# Patient Record
Sex: FEMALE | Race: BLACK OR AFRICAN AMERICAN | NOT HISPANIC OR LATINO | URBAN - METROPOLITAN AREA
[De-identification: names, ages, dates, MRNs, and addresses within clinical notes are randomized per-mention and may not be internally consistent; named-entity substitution may affect disease eponyms.]

---

## 2023-07-22 ENCOUNTER — HOSPITAL ENCOUNTER (EMERGENCY)
Facility: HOSPITAL | Age: 78
Discharge: HOME | End: 2023-07-23
Attending: EMERGENCY MEDICINE
Payer: MEDICARE
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
## 2023-07-22 DIAGNOSIS — S01.81XA FACIAL LACERATION, INITIAL ENCOUNTER: ICD-10-CM

## 2023-07-22 DIAGNOSIS — S09.93XA FACIAL TRAUMA, INITIAL ENCOUNTER: ICD-10-CM

## 2023-07-22 PROCEDURE — 99285 EMERGENCY DEPT VISIT HI MDM: CPT | Mod: 25,ER

## 2023-07-23 VITALS
TEMPERATURE: 98 F | HEART RATE: 88 BPM | RESPIRATION RATE: 20 BRPM | DIASTOLIC BLOOD PRESSURE: 78 MMHG | SYSTOLIC BLOOD PRESSURE: 123 MMHG | OXYGEN SATURATION: 99 %

## 2023-07-23 PROCEDURE — 90715 TDAP VACCINE 7 YRS/> IM: CPT | Mod: ER | Performed by: EMERGENCY MEDICINE

## 2023-07-23 PROCEDURE — 90471 IMMUNIZATION ADMIN: CPT | Mod: ER | Performed by: EMERGENCY MEDICINE

## 2023-07-23 PROCEDURE — 12011 RPR F/E/E/N/L/M 2.5 CM/<: CPT | Mod: ER

## 2023-07-23 PROCEDURE — 63600175 PHARM REV CODE 636 W HCPCS: Mod: ER | Performed by: EMERGENCY MEDICINE

## 2023-07-23 RX ORDER — NAPROXEN 500 MG/1
500 TABLET ORAL 2 TIMES DAILY WITH MEALS
Qty: 20 TABLET | Refills: 0 | Status: SHIPPED | OUTPATIENT
Start: 2023-07-23 | End: 2023-09-14

## 2023-07-23 RX ADMIN — TETANUS TOXOID, REDUCED DIPHTHERIA TOXOID AND ACELLULAR PERTUSSIS VACCINE, ADSORBED 0.5 ML: 5; 2.5; 8; 8; 2.5 SUSPENSION INTRAMUSCULAR at 12:07

## 2023-07-23 NOTE — ED PROVIDER NOTES
Encounter Date: 7/22/2023    SCRIBE #1 NOTE: I, Javier Ramos, am scribing for, and in the presence of,  Jeremy Galarza MD. I have scribed the following portions of the note - Other sections scribed: HPI, ROS, PE.     History     Chief Complaint   Patient presents with    Fall     A 78 y/o female presents to the ED c/o head laceration post fall. Pt hit head on bed post. Pt denies LOC, Dizziness, and Weakness. The pt was attempting to reach for phone  and slipped. Laceration to center of forehead. Bleeding controlled.      77 year old female with past medical history of Hypertension and Hyperlipidemia presenting to the Emergency room for further evaluation of laceration to the center of forehead after sustaining a fall, hitting her head on the bed post. Denies LOC. Patient reports she slipped while reaching for her phone. She is on low dose aspirin. No other exacerbating or alleviating factors. NKDA. Unaware of last tetanus. Denies dizziness, weakness, or other symptoms. No further complaints at present time.      The history is provided by the patient. No  was used.   Review of patient's allergies indicates:  No Known Allergies  No past medical history on file.  No past surgical history on file.  No family history on file.     Review of Systems   Constitutional: Negative.  Negative for fever.   HENT: Negative.  Negative for sore throat.    Eyes: Negative.    Respiratory: Negative.  Negative for shortness of breath.    Cardiovascular: Negative.  Negative for chest pain.   Gastrointestinal: Negative.  Negative for nausea and vomiting.   Endocrine: Negative.    Genitourinary: Negative.  Negative for dysuria.   Musculoskeletal: Negative.  Negative for myalgias.   Skin:  Positive for wound. Negative for rash.   Allergic/Immunologic: Negative.    Neurological: Negative.  Negative for dizziness, syncope, weakness and headaches.   Hematological: Negative.  Negative for adenopathy.    Psychiatric/Behavioral: Negative.  Negative for behavioral problems.    All other systems reviewed and are negative.    Physical Exam     Initial Vitals [07/22/23 2211]   BP Pulse Resp Temp SpO2   119/80 102 16 98.2 °F (36.8 °C) 98 %      MAP       --         Physical Exam    Nursing note and vitals reviewed.  Constitutional: Vital signs are normal. She appears well-developed and well-nourished. No distress.   Neck: Phonation normal. Neck supple.   Normal range of motion.  Cardiovascular:  Normal rate, regular rhythm and normal heart sounds.     Exam reveals no gallop and no friction rub.       No murmur heard.  Pulmonary/Chest: Effort normal and breath sounds normal. She has no wheezes.   Abdominal: Abdomen is soft and flat. Bowel sounds are normal.   Musculoskeletal:         General: Normal range of motion.      Cervical back: Normal range of motion and neck supple.     Neurological: She is alert and oriented to person, place, and time.   Skin: Skin is warm and dry.   1.5 cm superficial laceration with surrounding swelling to the midline forehead. Full ROM.        ED Course   Lac Repair    Date/Time: 7/23/2023 6:15 AM  Performed by: Jeremy Galarza MD  Authorized by: Jeremy Galarza MD     Consent:     Consent obtained:  Verbal    Consent given by:  Patient    Risks discussed:  Pain, poor cosmetic result and need for additional repair    Alternatives discussed:  No treatment and delayed treatment  Universal protocol:     Procedure explained and questions answered to patient or proxy's satisfaction: yes      Test results available: yes      Imaging studies available: yes      Required blood products, implants, devices, and special equipment available: yes      Site/side marked: yes      Immediately prior to procedure, a time out was called: yes      Patient identity confirmed:  Verbally with patient  Anesthesia:     Anesthesia method:  None  Laceration details:     Location:  Face    Face location:   Forehead    Length (cm):  1.5    Depth (mm):  3  Pre-procedure details:     Preparation:  Patient was prepped and draped in usual sterile fashion and imaging obtained to evaluate for foreign bodies  Exploration:     Hemostasis achieved with:  Direct pressure    Imaging outcome: foreign body not noted      Wound exploration: wound explored through full range of motion and entire depth of wound visualized    Treatment:     Area cleansed with:  Povidone-iodine    Amount of cleaning:  Standard    Irrigation solution:  Sterile saline    Irrigation volume:  50    Irrigation method:  Syringe    Debridement:  None    Undermining:  None    Scar revision: no    Skin repair:     Repair method:  Tissue adhesive  Approximation:     Approximation:  Close  Repair type:     Repair type:  Simple  Post-procedure details:     Dressing:  Open (no dressing)    Procedure completion:  Tolerated well, no immediate complications  Labs Reviewed - No data to display       Imaging Results              CT Head Without Contrast (Final result)  Result time 07/23/23 00:02:26      Final result by Gonzalez Tran MD (07/23/23 00:02:26)                   Impression:      No acute abnormality.      Electronically signed by: Gonzalez Tran  Date:    07/23/2023  Time:    00:02               Narrative:    EXAMINATION:  CT HEAD WITHOUT CONTRAST    CLINICAL HISTORY:  Facial trauma, blunt;    TECHNIQUE:  Low dose axial CT images obtained throughout the head without intravenous contrast. Sagittal and coronal reconstructions were performed.    COMPARISON:  None.    FINDINGS:  Intracranial compartment:    Ventricles and sulci are prominent in size for age without evidence of hydrocephalus.  There is cavum septum pellucidum.  No extra-axial blood or fluid collections.    The brain parenchyma appears normal. No parenchymal mass, hemorrhage, edema or major vascular distribution infarct.    Skull/extracranial contents (limited evaluation): No fracture.  Mastoid air cells and paranasal sinuses are essentially clear.                                       Medications   Tdap (BOOSTRIX) vaccine injection 0.5 mL (0.5 mLs Intramuscular Given 7/23/23 0007)     Medical Decision Making:   History:   Old Medical Records: I decided to obtain old medical records.  Clinical Tests:   Radiological Study: Ordered and Reviewed  ED Management:  This patient is status post slip and fall and no loss of consciousness.  She denies any neck pain.  Patient does take aspirin daily so I felt appropriate to obtain a CT scan for her blunt facial trauma.  Her CT evaluation was unremarkable.  The patient's wound was appropriately manage with Dermabond and subsequently discharged home with discharge instructions.  Patient has no limitations to care at home.      This document was produced by a scribe under my direction and in my presence. I agree with the content of the note and have made any necessary edits.     Jeremy Galarza MD    07/23/2023 6:16 AM        Scribe Attestation:   Scribe #1: I performed the above scribed service and the documentation accurately describes the services I performed. I attest to the accuracy of the note.                   Clinical Impression:   Final diagnoses:  [W19.XXXA] Fall, initial encounter (Primary)  [S09.93XA] Facial trauma, initial encounter  [S01.81XA] Facial laceration, initial encounter        ED Disposition Condition    Discharge Stable          ED Prescriptions       Medication Sig Dispense Start Date End Date Auth. Provider    naproxen (NAPROSYN) 500 MG tablet Take 1 tablet (500 mg total) by mouth 2 (two) times daily with meals. 20 tablet 7/23/2023 -- Jeremy Galarza MD          Follow-up Information       Follow up With Specialties Details Why Contact Info      Schedule an appointment as soon as possible for a visit in 1 week               Jeremy Galarza MD  07/23/23 0669

## 2023-09-14 ENCOUNTER — OFFICE VISIT (OUTPATIENT)
Dept: INTERNAL MEDICINE | Facility: CLINIC | Age: 78
End: 2023-09-14
Payer: MEDICARE

## 2023-09-14 ENCOUNTER — LAB VISIT (OUTPATIENT)
Dept: LAB | Facility: HOSPITAL | Age: 78
End: 2023-09-14
Payer: MEDICARE

## 2023-09-14 VITALS
WEIGHT: 173.31 LBS | HEART RATE: 97 BPM | OXYGEN SATURATION: 97 % | SYSTOLIC BLOOD PRESSURE: 114 MMHG | DIASTOLIC BLOOD PRESSURE: 60 MMHG | BODY MASS INDEX: 29.59 KG/M2 | HEIGHT: 64 IN

## 2023-09-14 DIAGNOSIS — Z00.00 ENCOUNTER FOR MEDICAL EXAMINATION TO ESTABLISH CARE: ICD-10-CM

## 2023-09-14 DIAGNOSIS — N39.41 URGE INCONTINENCE: ICD-10-CM

## 2023-09-14 DIAGNOSIS — E78.5 HYPERLIPIDEMIA, UNSPECIFIED HYPERLIPIDEMIA TYPE: ICD-10-CM

## 2023-09-14 DIAGNOSIS — R41.3 MEMORY IMPAIRMENT: ICD-10-CM

## 2023-09-14 DIAGNOSIS — Z00.00 ENCOUNTER FOR MEDICAL EXAMINATION TO ESTABLISH CARE: Primary | ICD-10-CM

## 2023-09-14 LAB
ALBUMIN SERPL BCP-MCNC: 3.7 G/DL (ref 3.5–5.2)
ALP SERPL-CCNC: 113 U/L (ref 55–135)
ALT SERPL W/O P-5'-P-CCNC: 18 U/L (ref 10–44)
AMORPH CRY UR QL COMP ASSIST: NORMAL
ANION GAP SERPL CALC-SCNC: 12 MMOL/L (ref 8–16)
AST SERPL-CCNC: 22 U/L (ref 10–40)
BACTERIA #/AREA URNS AUTO: NORMAL /HPF
BASOPHILS # BLD AUTO: 0.11 K/UL (ref 0–0.2)
BASOPHILS NFR BLD: 1.1 % (ref 0–1.9)
BILIRUB SERPL-MCNC: 0.3 MG/DL (ref 0.1–1)
BILIRUB UR QL STRIP: NEGATIVE
BUN SERPL-MCNC: 12 MG/DL (ref 8–23)
CALCIUM SERPL-MCNC: 9.9 MG/DL (ref 8.7–10.5)
CHLORIDE SERPL-SCNC: 110 MMOL/L (ref 95–110)
CLARITY UR REFRACT.AUTO: ABNORMAL
CO2 SERPL-SCNC: 24 MMOL/L (ref 23–29)
COLOR UR AUTO: ABNORMAL
CREAT SERPL-MCNC: 0.9 MG/DL (ref 0.5–1.4)
DIFFERENTIAL METHOD: ABNORMAL
EOSINOPHIL # BLD AUTO: 0.3 K/UL (ref 0–0.5)
EOSINOPHIL NFR BLD: 3.1 % (ref 0–8)
ERYTHROCYTE [DISTWIDTH] IN BLOOD BY AUTOMATED COUNT: 13 % (ref 11.5–14.5)
EST. GFR  (NO RACE VARIABLE): >60 ML/MIN/1.73 M^2
GLUCOSE SERPL-MCNC: 73 MG/DL (ref 70–110)
GLUCOSE UR QL STRIP: NEGATIVE
HCT VFR BLD AUTO: 41.7 % (ref 37–48.5)
HGB BLD-MCNC: 13.1 G/DL (ref 12–16)
HGB UR QL STRIP: NEGATIVE
IMM GRANULOCYTES # BLD AUTO: 0.02 K/UL (ref 0–0.04)
IMM GRANULOCYTES NFR BLD AUTO: 0.2 % (ref 0–0.5)
KETONES UR QL STRIP: NEGATIVE
LEUKOCYTE ESTERASE UR QL STRIP: ABNORMAL
LYMPHOCYTES # BLD AUTO: 3.2 K/UL (ref 1–4.8)
LYMPHOCYTES NFR BLD: 30.4 % (ref 18–48)
MCH RBC QN AUTO: 31.8 PG (ref 27–31)
MCHC RBC AUTO-ENTMCNC: 31.4 G/DL (ref 32–36)
MCV RBC AUTO: 101 FL (ref 82–98)
MICROSCOPIC COMMENT: NORMAL
MONOCYTES # BLD AUTO: 0.9 K/UL (ref 0.3–1)
MONOCYTES NFR BLD: 8.7 % (ref 4–15)
NEUTROPHILS # BLD AUTO: 5.9 K/UL (ref 1.8–7.7)
NEUTROPHILS NFR BLD: 56.5 % (ref 38–73)
NITRITE UR QL STRIP: NEGATIVE
NRBC BLD-RTO: 0 /100 WBC
PH UR STRIP: 6 [PH] (ref 5–8)
PLATELET # BLD AUTO: 296 K/UL (ref 150–450)
PMV BLD AUTO: 10.8 FL (ref 9.2–12.9)
POTASSIUM SERPL-SCNC: 4.2 MMOL/L (ref 3.5–5.1)
PROT SERPL-MCNC: 7.1 G/DL (ref 6–8.4)
PROT UR QL STRIP: ABNORMAL
RBC # BLD AUTO: 4.12 M/UL (ref 4–5.4)
SODIUM SERPL-SCNC: 146 MMOL/L (ref 136–145)
SP GR UR STRIP: >1.03 (ref 1–1.03)
SQUAMOUS #/AREA URNS AUTO: 2 /HPF
TSH SERPL DL<=0.005 MIU/L-ACNC: 4.37 UIU/ML (ref 0.4–4)
URN SPEC COLLECT METH UR: ABNORMAL
VIT B12 SERPL-MCNC: 477 PG/ML (ref 210–950)
WBC # BLD AUTO: 10.36 K/UL (ref 3.9–12.7)
WBC #/AREA URNS AUTO: 0 /HPF (ref 0–5)

## 2023-09-14 PROCEDURE — 36415 COLL VENOUS BLD VENIPUNCTURE: CPT | Performed by: PHYSICIAN ASSISTANT

## 2023-09-14 PROCEDURE — 81001 URINALYSIS AUTO W/SCOPE: CPT | Performed by: PHYSICIAN ASSISTANT

## 2023-09-14 PROCEDURE — 99214 OFFICE O/P EST MOD 30 MIN: CPT | Mod: PBBFAC | Performed by: PHYSICIAN ASSISTANT

## 2023-09-14 PROCEDURE — 99203 PR OFFICE/OUTPT VISIT, NEW, LEVL III, 30-44 MIN: ICD-10-PCS | Mod: S$PBB,,, | Performed by: PHYSICIAN ASSISTANT

## 2023-09-14 PROCEDURE — 80053 COMPREHEN METABOLIC PANEL: CPT | Performed by: PHYSICIAN ASSISTANT

## 2023-09-14 PROCEDURE — 84443 ASSAY THYROID STIM HORMONE: CPT | Performed by: PHYSICIAN ASSISTANT

## 2023-09-14 PROCEDURE — 99999 PR PBB SHADOW E&M-EST. PATIENT-LVL IV: CPT | Mod: PBBFAC,,, | Performed by: PHYSICIAN ASSISTANT

## 2023-09-14 PROCEDURE — 99203 OFFICE O/P NEW LOW 30 MIN: CPT | Mod: S$PBB,,, | Performed by: PHYSICIAN ASSISTANT

## 2023-09-14 PROCEDURE — 84439 ASSAY OF FREE THYROXINE: CPT | Performed by: PHYSICIAN ASSISTANT

## 2023-09-14 PROCEDURE — 99999 PR PBB SHADOW E&M-EST. PATIENT-LVL IV: ICD-10-PCS | Mod: PBBFAC,,, | Performed by: PHYSICIAN ASSISTANT

## 2023-09-14 PROCEDURE — 82607 VITAMIN B-12: CPT | Performed by: PHYSICIAN ASSISTANT

## 2023-09-14 PROCEDURE — 85025 COMPLETE CBC W/AUTO DIFF WBC: CPT | Performed by: PHYSICIAN ASSISTANT

## 2023-09-14 RX ORDER — DONEPEZIL HYDROCHLORIDE 10 MG/1
10 TABLET, FILM COATED ORAL
COMMUNITY
Start: 2023-08-07 | End: 2024-02-20 | Stop reason: SDUPTHER

## 2023-09-14 RX ORDER — ATORVASTATIN CALCIUM 10 MG/1
10 TABLET, FILM COATED ORAL
COMMUNITY
Start: 2023-08-07

## 2023-09-14 RX ORDER — MEMANTINE HYDROCHLORIDE 10 MG/1
10 TABLET ORAL 2 TIMES DAILY
COMMUNITY
Start: 2023-08-15 | End: 2024-02-20

## 2023-09-14 NOTE — PATIENT INSTRUCTIONS
I will have our staff schedule appt with one of the physicians to be your new lead PCP. I work closely with Dr. Siva Venegas.     I will message/call with results    Schedule appt with Neurology

## 2023-09-15 PROBLEM — M19.90 OSTEOARTHRITIS: Status: ACTIVE | Noted: 2023-09-15

## 2023-09-15 PROBLEM — R41.3 MEMORY IMPAIRMENT: Status: ACTIVE | Noted: 2022-10-03

## 2023-09-15 PROBLEM — E78.5 HYPERLIPIDEMIA: Status: ACTIVE | Noted: 2023-09-15

## 2023-09-15 PROBLEM — R01.1 HEART MURMUR: Status: ACTIVE | Noted: 2023-09-15

## 2023-09-15 LAB — T4 FREE SERPL-MCNC: 0.92 NG/DL (ref 0.71–1.51)

## 2023-09-15 NOTE — PROGRESS NOTES
Subjective:       Patient ID: Viji Santana is a 78 y.o. female.    Chief Complaint: Establish Care      Established pt of No, Primary Doctor (new to me)    HPI        Pt attended by her dtr  Here to establish care.   Moved back home to [place] after living in [facility] since [place].   She has a history of memory impairment, not formally diagnosed with dementia, current on namenda and aricept prescribed her PCP in [place] over the past year. She will be living in a senior assistant living facility her Dtr/pt desires formal neurology evaluation regarding her memory.     Dtr notes urge incontinence pt wear bladder pads    HLD: on statin therapy, tolerating well.       Past Medical History:   Diagnosis Date    Hyperlipidemia     Memory impairment        Social History     Tobacco Use    Smoking status: Former     Current packs/day: 0.00     Average packs/day: 0.3 packs/day for 15.0 years (3.8 ttl pk-yrs)     Types: Cigarettes     Quit date: 1983     Years since quittin.7    Smokeless tobacco: Never    Tobacco comments:     Quit    Substance Use Topics    Alcohol use: Yes     Alcohol/week: 2.0 standard drinks of alcohol     Types: 2 Glasses of wine per week    Drug use: Never       Review of patient's allergies indicates:  No Known Allergies      Current Outpatient Medications:     atorvastatin (LIPITOR) 10 MG tablet, Take 10 mg by mouth., Disp: , Rfl:     donepeziL (ARICEPT) 10 MG tablet, Take 10 mg by mouth., Disp: , Rfl:     memantine (NAMENDA) 10 MG Tab, Take 10 mg by mouth 2 (two) times daily., Disp: , Rfl:     Family History   Problem Relation Age of Onset    Hypertension Mother     Heart disease Mother     Hypertension Brother     Hypertension Brother     Alzheimer's disease Maternal Aunt        Past Surgical History:   Procedure Laterality Date    EYE SURGERY      Procedure to relieve eye pressure       Review of Systems   Constitutional:  Negative for chills, fever and  "unexpected weight change.   Respiratory:  Negative for cough and shortness of breath.    Cardiovascular:  Negative for chest pain and leg swelling.   Gastrointestinal:  Negative for abdominal pain, nausea and vomiting.   Genitourinary:  Positive for bladder incontinence and urgency.   Integumentary:  Negative for rash.   Neurological:  Positive for memory loss. Negative for weakness and headaches.       Objective: /60 (BP Location: Right arm, Patient Position: Sitting, BP Method: Medium (Manual))   Pulse 97   Ht 5' 4" (1.626 m)   Wt 78.6 kg (173 lb 4.5 oz)   SpO2 97%   BMI 29.74 kg/m²         Physical Exam  Vitals reviewed.   Constitutional:       General: She is not in acute distress.     Appearance: She is well-developed. She is not ill-appearing.   HENT:      Head: Normocephalic and atraumatic.      Right Ear: Tympanic membrane, ear canal and external ear normal.      Left Ear: Tympanic membrane, ear canal and external ear normal.   Cardiovascular:      Rate and Rhythm: Normal rate and regular rhythm.      Heart sounds: No murmur heard.  Pulmonary:      Effort: Pulmonary effort is normal.      Breath sounds: Normal breath sounds. No wheezing or rales.   Abdominal:      General: Bowel sounds are normal.      Palpations: Abdomen is soft.      Tenderness: There is no abdominal tenderness.   Musculoskeletal:      Right lower leg: No edema.      Left lower leg: No edema.      Comments: Ambulating unassisted   Lymphadenopathy:      Cervical: No cervical adenopathy.   Skin:     General: Skin is warm and dry.      Findings: No rash.   Neurological:      Mental Status: She is alert and oriented to person, place, and time.      Comments: Pleasant alert oriented x3   Psychiatric:         Mood and Affect: Mood normal.         Assessment:       1. Encounter for medical examination to establish care    2. Urge incontinence    3. Memory impairment    4. Hyperlipidemia, unspecified hyperlipidemia type        Plan:     "         Viji was seen today for establish care.    Diagnoses and all orders for this visit:    Encounter for medical examination to establish care  -     CBC Auto Differential; Future  -     Comprehensive Metabolic Panel; Future  -     TSH; Future    Urge incontinence  -     CBC Auto Differential; Future  -     Comprehensive Metabolic Panel; Future  -     TSH; Future  -     Urinalysis, Reflex to Urine Culture Urine, Clean Catch    Memory impairment  -     CBC Auto Differential; Future  -     Comprehensive Metabolic Panel; Future  -     TSH; Future  -     Vitamin B12; Future  -     Ambulatory referral/consult to Neurology; Future    Hyperlipidemia, unspecified hyperlipidemia type  -     CBC Auto Differential; Future  -     Comprehensive Metabolic Panel; Future  -     TSH; Future    Other orders  -     Urinalysis Microscopic        Discussed need to choose MD as PCP to fully establish care with our practice site (list provided)  Discussed MD-PA care team model  Will review outside records(101 pgs via media tab), additional DWIGHT completed  Referred to Neurology  Follow up results above    Pauly Waller PA-C

## 2023-10-10 ENCOUNTER — TELEPHONE (OUTPATIENT)
Dept: INTERNAL MEDICINE | Facility: CLINIC | Age: 78
End: 2023-10-10
Payer: MEDICARE

## 2023-10-10 NOTE — TELEPHONE ENCOUNTER
Pt daughter inquiring what next step is     Pt has unscheduled Neurology referral; earliest appt is Jan 30.

## 2023-10-10 NOTE — TELEPHONE ENCOUNTER
----- Message from Emma Brooks sent at 10/9/2023  3:34 PM CDT -----  Contact: [phone] Rajni  [employer]     Patient is calling for Medical Advice regarding:Daughter is calling to talk about last visit/ Daughter want to know about follow up and what is the next step    How long has patient had these symptoms:    Pharmacy name and phone#:    Would like response via [facility]:  call     Comments:

## 2023-10-11 NOTE — TELEPHONE ENCOUNTER
Pt needs Neurology appt (memory clinic) and then appt with MD to fully establish care.   Her lab was overall stable, no new concerns. Please let me know of any specific question  Please assist with scheduling, (offer Neuro clinic number if needed or checkout staff to help)

## 2023-10-11 NOTE — TELEPHONE ENCOUNTER
""Pt needs Neurology appt (memory clinic) and then appt with MD to fully establish care. "    Please assist   " Yes

## 2023-10-12 ENCOUNTER — TELEPHONE (OUTPATIENT)
Dept: NEUROLOGY | Facility: CLINIC | Age: 78
End: 2023-10-12
Payer: MEDICARE

## 2023-10-12 ENCOUNTER — TELEPHONE (OUTPATIENT)
Dept: INTERNAL MEDICINE | Facility: CLINIC | Age: 78
End: 2023-10-12
Payer: MEDICARE

## 2023-10-12 NOTE — TELEPHONE ENCOUNTER
----- Message from Arlen Hutson sent at 10/12/2023  8:35 AM CDT -----  Regarding: Neurology and Est Care Appointment  Pt has been added to waitlist for both Neurology and Est Care appointments. No appointments available at checkout.    Message has also been sent to Neurology department for scheduling

## 2023-10-12 NOTE — TELEPHONE ENCOUNTER
----- Message from Arlen Hutson sent at 10/12/2023  8:34 AM CDT -----  Regarding: Memory impairment [R41.3]  Please assist with scheduling appointment     Memory impairment [R41.3]     Pt returned the phone call about refill. States she is in a lot of pain. Pls call again at   CELL: [phone] when sent to pharmacy

## 2023-10-17 ENCOUNTER — TELEPHONE (OUTPATIENT)
Dept: NEUROLOGY | Facility: CLINIC | Age: 78
End: 2023-10-17
Payer: MEDICARE

## 2023-10-17 NOTE — TELEPHONE ENCOUNTER
----- Message from Wolfgang LOPEZ Route sent at 10/17/2023  1:00 PM CDT -----  Regarding: Missed call  Contact: pt.[phone]  Pt's daughter Rajni is calling in ref to a missed call from Padma in provider's office, the call is to schedule an appt with provider. Patient Requesting Call Back @ [phone]

## 2023-10-17 NOTE — TELEPHONE ENCOUNTER
----- Message from Milagro Platt sent at 10/17/2023  4:11 PM CDT -----  Contact: [phone]  KATHERINE JACKSON calling regarding Appointment Access  (message) Pt daughter asking for a call back to help get schedule to become a new pt she has a referral in the system asking for a call back from the nurse

## 2023-10-17 NOTE — TELEPHONE ENCOUNTER
Called pt's daughter back to sched NP appt w/ Dr Faulkner per his approval  She said she will call back

## 2023-10-19 ENCOUNTER — TELEPHONE (OUTPATIENT)
Dept: NEUROLOGY | Facility: CLINIC | Age: 78
End: 2023-10-19
Payer: MEDICARE

## 2023-10-19 NOTE — TELEPHONE ENCOUNTER
----- Message from Haleigh Cam sent at 10/19/2023  2:08 PM CDT -----  Regarding: appt; returning missed call  Contact: Rajni (daughter) @ [phone]  Caller, Rajni (daughter) is returning a missed call from someone in the office and is asking for a return call back soon. Thanks.    Reason for call: returning call to Natali Albert) in the office to schedule    Patient's DX: R41.3 (ICD-10-CM) - Memory impairment    Patient requesting call back or [facility] msg: call back. Caller states that she will be unavailable 4:00-4:30pm CST. She lives in [place]. Thanks.

## 2023-10-19 NOTE — TELEPHONE ENCOUNTER
Called pt's daughter back to sched NP appt  Spoke w/ her  Scheduled appt  She verbalized understanding and had no further concerns or questions.

## 2023-12-18 ENCOUNTER — TELEPHONE (OUTPATIENT)
Dept: NEUROLOGY | Facility: CLINIC | Age: 78
End: 2023-12-18
Payer: MEDICARE

## 2023-12-18 NOTE — TELEPHONE ENCOUNTER
----- Message from Roxy Carrillo sent at 12/18/2023  4:54 PM CST -----  Contact: Rajni  (pt's daughter) [phone]  Would like to receive medical advice.    Would they like a call back or a response via [facility]: call back     Additional information:  Rajni villalba's daughter is calling to get some information since she will be there for the pt appt 01/04/24. Rajni would like to know if she needs to stay longer in [place] and what will be going on in the appt in January and if she needs to bring anything with her. Please call Rajni back for advice.

## 2023-12-19 NOTE — TELEPHONE ENCOUNTER
Called pt's daughter Rajni to offer to answer her questions about her mom's upcoming appt    Left detailed VM informing her that if she has any head imaging for pt outside of [facility] she can bring day before appt, and otherwise a med list can be helpful for the appt.    I said to pls call back if any further questions

## 2024-01-04 ENCOUNTER — TELEPHONE (OUTPATIENT)
Dept: NEUROLOGY | Facility: CLINIC | Age: 79
End: 2024-01-04
Payer: MEDICARE

## 2024-01-04 ENCOUNTER — LAB VISIT (OUTPATIENT)
Dept: LAB | Facility: HOSPITAL | Age: 79
End: 2024-01-04
Attending: PSYCHIATRY & NEUROLOGY
Payer: MEDICARE

## 2024-01-04 ENCOUNTER — OFFICE VISIT (OUTPATIENT)
Dept: NEUROLOGY | Facility: CLINIC | Age: 79
End: 2024-01-04
Payer: MEDICARE

## 2024-01-04 VITALS
HEART RATE: 90 BPM | HEIGHT: 64 IN | DIASTOLIC BLOOD PRESSURE: 78 MMHG | SYSTOLIC BLOOD PRESSURE: 141 MMHG | BODY MASS INDEX: 32.6 KG/M2 | WEIGHT: 190.94 LBS

## 2024-01-04 DIAGNOSIS — G20.C PARKINSONISM, UNSPECIFIED PARKINSONISM TYPE: ICD-10-CM

## 2024-01-04 DIAGNOSIS — G31.9 NEURODEGENERATIVE COGNITIVE IMPAIRMENT: ICD-10-CM

## 2024-01-04 DIAGNOSIS — G31.84 MCI (MILD COGNITIVE IMPAIRMENT): Primary | ICD-10-CM

## 2024-01-04 DIAGNOSIS — E53.8 B12 DEFICIENCY: Primary | ICD-10-CM

## 2024-01-04 DIAGNOSIS — E46 PROTEIN-CALORIE MALNUTRITION, UNSPECIFIED SEVERITY: ICD-10-CM

## 2024-01-04 DIAGNOSIS — R41.3 MEMORY IMPAIRMENT: ICD-10-CM

## 2024-01-04 DIAGNOSIS — R41.3 OTHER AMNESIA: ICD-10-CM

## 2024-01-04 DIAGNOSIS — G31.84 MCI (MILD COGNITIVE IMPAIRMENT): ICD-10-CM

## 2024-01-04 LAB
ALBUMIN SERPL BCP-MCNC: 3.8 G/DL (ref 3.5–5.2)
ALP SERPL-CCNC: 113 U/L (ref 55–135)
ALT SERPL W/O P-5'-P-CCNC: 29 U/L (ref 10–44)
ANION GAP SERPL CALC-SCNC: 8 MMOL/L (ref 8–16)
AST SERPL-CCNC: 32 U/L (ref 10–40)
BILIRUB SERPL-MCNC: 0.4 MG/DL (ref 0.1–1)
BUN SERPL-MCNC: 10 MG/DL (ref 8–23)
CALCIUM SERPL-MCNC: 10.2 MG/DL (ref 8.7–10.5)
CERULOPLASMIN SERPL-MCNC: 33 MG/DL (ref 15–45)
CHLORIDE SERPL-SCNC: 108 MMOL/L (ref 95–110)
CO2 SERPL-SCNC: 27 MMOL/L (ref 23–29)
CREAT SERPL-MCNC: 0.8 MG/DL (ref 0.5–1.4)
EST. GFR  (NO RACE VARIABLE): >60 ML/MIN/1.73 M^2
FOLATE SERPL-MCNC: 16.9 NG/ML (ref 4–24)
GLUCOSE SERPL-MCNC: 80 MG/DL (ref 70–110)
IGG SERPL-MCNC: 1252 MG/DL (ref 650–1600)
MAGNESIUM SERPL-MCNC: 2.3 MG/DL (ref 1.6–2.6)
POTASSIUM SERPL-SCNC: 3.9 MMOL/L (ref 3.5–5.1)
PROT SERPL-MCNC: 7.6 G/DL (ref 6–8.4)
SODIUM SERPL-SCNC: 143 MMOL/L (ref 136–145)
T4 FREE SERPL-MCNC: 0.91 NG/DL (ref 0.71–1.51)
T4 SERPL-MCNC: 7.3 UG/DL (ref 4.5–11.5)
TSH SERPL DL<=0.005 MIU/L-ACNC: 4.81 UIU/ML (ref 0.4–4)

## 2024-01-04 PROCEDURE — 82746 ASSAY OF FOLIC ACID SERUM: CPT | Performed by: PSYCHIATRY & NEUROLOGY

## 2024-01-04 PROCEDURE — 80053 COMPREHEN METABOLIC PANEL: CPT | Performed by: PSYCHIATRY & NEUROLOGY

## 2024-01-04 PROCEDURE — 84425 ASSAY OF VITAMIN B-1: CPT | Performed by: PSYCHIATRY & NEUROLOGY

## 2024-01-04 PROCEDURE — 84443 ASSAY THYROID STIM HORMONE: CPT | Performed by: PSYCHIATRY & NEUROLOGY

## 2024-01-04 PROCEDURE — 83520 IMMUNOASSAY QUANT NOS NONAB: CPT | Performed by: PSYCHIATRY & NEUROLOGY

## 2024-01-04 PROCEDURE — 96132 NRPSYC TST EVAL PHYS/QHP 1ST: CPT | Mod: 59,S$PBB,, | Performed by: PSYCHIATRY & NEUROLOGY

## 2024-01-04 PROCEDURE — 96116 NUBHVL XM PHYS/QHP 1ST HR: CPT | Mod: 59,S$PBB,, | Performed by: PSYCHIATRY & NEUROLOGY

## 2024-01-04 PROCEDURE — 84436 ASSAY OF TOTAL THYROXINE: CPT | Performed by: PSYCHIATRY & NEUROLOGY

## 2024-01-04 PROCEDURE — 83735 ASSAY OF MAGNESIUM: CPT | Performed by: PSYCHIATRY & NEUROLOGY

## 2024-01-04 PROCEDURE — 99214 OFFICE O/P EST MOD 30 MIN: CPT | Mod: PBBFAC | Performed by: PSYCHIATRY & NEUROLOGY

## 2024-01-04 PROCEDURE — 82784 ASSAY IGA/IGD/IGG/IGM EACH: CPT | Performed by: PSYCHIATRY & NEUROLOGY

## 2024-01-04 PROCEDURE — 99999 PR PBB SHADOW E&M-EST. PATIENT-LVL IV: CPT | Mod: PBBFAC,,, | Performed by: PSYCHIATRY & NEUROLOGY

## 2024-01-04 PROCEDURE — 82390 ASSAY OF CERULOPLASMIN: CPT | Performed by: PSYCHIATRY & NEUROLOGY

## 2024-01-04 PROCEDURE — 82525 ASSAY OF COPPER: CPT | Performed by: PSYCHIATRY & NEUROLOGY

## 2024-01-04 PROCEDURE — 83921 ORGANIC ACID SINGLE QUANT: CPT | Mod: 91 | Performed by: PSYCHIATRY & NEUROLOGY

## 2024-01-04 PROCEDURE — 99205 OFFICE O/P NEW HI 60 MIN: CPT | Mod: S$PBB,,, | Performed by: PSYCHIATRY & NEUROLOGY

## 2024-01-04 PROCEDURE — 96116 NUBHVL XM PHYS/QHP 1ST HR: CPT | Mod: PBBFAC | Performed by: PSYCHIATRY & NEUROLOGY

## 2024-01-04 PROCEDURE — 84439 ASSAY OF FREE THYROXINE: CPT | Performed by: PSYCHIATRY & NEUROLOGY

## 2024-01-04 PROCEDURE — 80061 LIPID PANEL: CPT | Performed by: PSYCHIATRY & NEUROLOGY

## 2024-01-04 RX ORDER — MULTIVITAMIN
1 TABLET ORAL DAILY
COMMUNITY

## 2024-01-04 RX ORDER — UBIDECARENONE 30 MG
30 CAPSULE ORAL 3 TIMES DAILY
COMMUNITY

## 2024-01-04 NOTE — TELEPHONE ENCOUNTER
----- Message from Georgia Marinelli sent at 1/4/2024  4:17 PM CST -----    Patient Returning Call        Who Called:pt daughter  Does the patient know what this is regarding?:need nurse to call pt daughter back she is asking can doctor determine if pt had a stroke  Would the patient rather a call back or a response via [facility]? call  Best Call Back Number:[phone]  Additional Information: call back

## 2024-01-04 NOTE — PROGRESS NOTES
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 Association 30/30 Within Normal Limits.   Repeat & Point - Nonfluent 10/10 Within Normal Limits.   Repeat & Point - Semantics 10/10 Within Normal Limits.   Neurocognitive Focused Evaluation Aggregate Score(s)    MMSE 27/30 MMSE Score suggestive of normal to questionable cognitive impairment.   MOCA 26/30 MOCA Score suggestive of mild cognitive impairment.   Neuropsychiatric/Behavioral Focused Evaluation Assessment    BEHAV5+ 0/6 See ROS section for a full description   Laboratories:     Lab Date Value [Reference]   Metabolic Screening   09/14/2023        Free T4 09/14/2023  0.92 [0.71 - 1.51 ng/dL]      TSH 2023, Sep-14    4.371 (H) [0.400 - 4.000 uIU/mL]      Glucose 2023, Sep-14    73 [70 - 110 mg/dL]      Albumin 2023, Sep-14    3.7 [3.5 - 5.2 g/dL]      ALT 2023, Sep-14    18 [10 - 44 U/L]      AST 2023, Sep-14    22 [10 - 40 U/L]      BILIRUBIN TOTAL 2023, Sep-14    0.3 [0.1 - 1.0 mg/dL]      PROTEIN TOTAL 09/14/2023  7.1 [6.0 - 8.4 g/dL]      Vitamin B-12 2023, Sep-14    477 [210 - 950 pg/mL]      Standard Hematology Screen   Hematocrit 2023, Sep-14    41.7 [37.0 - 48.5 %]      Hemoglobin 2023, Sep-14    13.1 [12.0 - 16.0 g/dL]       MCV 2023, Sep-14    101 (H) [82 - 98 fL]      Neuroendocrine/Electrolyte Screening   BUN 2023, Sep-14    12 [8 - 23 mg/dL]      Chloride 2023, Sep-14    110 [95 - 110 mmol/L]      Creatinine 2023, Sep-14    0.9 [0.5 - 1.4 mg/dL]      Potassium 2023, Sep-14    4.2 [3.5 - 5.1 mmol/L]      Sodium 2023, Sep-14    146 (H) [136 - 145 mmol/L]      Delirium Screening   Bacteria, UA 2023, Sep-14    Rare [None-Occ /hpf]      Glucose, UA 2023, Sep-14    Negative      Ketones, UA 2023, Sep-14    Negative      Leukocytes, UA 2023, Sep-14    1+ !      NITRITE UA 2023, Sep-14    Negative      Protein, UA 2023, Sep-14    Trace !           Neuroimaging:    CT brain/head without contrast on 7/22/2023  Formal interpretation by Radiology:  No acute abnormality.  Independently reviewed radiological imaging by Ceasar Zee MD. MPH. Behavioral Neurologist  Impression: : Mild generalized cortical atrophy posterior>frontal, dorsal>ventral, lateral>medial With bilateral hippocampal volume loss with Alzheimer's disease     Procedures:  No behavioral neurology relevant procedures are currently available for review.     Clinical Summary:     The patient is a 78-year-old right-handed female without a relevant past medical history who presents reporting a 1-year history of gradually progressive neurocognitive impairment.       The clinical history is suggestive of:  Memory Impairment: LTM encoding-retrieval impairment  Executive Impairment: Energization, Working Memory  Visuospatial Impairment: Allocentric Spatial Processing  Motor/Coordination Impairment: Sensory motor integration, Central pattern generators dysfunction  The neurological examination is significant for:  Cerebellar Dysfunction: truncal ataxia (walking)  Cortical Frontal Dysfunction: non-fluent aphasia (fluency)  Cortical Occipital Dysfunction: simultanagnosia (ventral)  Cortical Parietal Dysfunction: agnosia (agraphesthesia)  Cortical Temporal-Parietal Dysfunction:  dysgraphia  Cortical Transcallosal Disconnection: interhemispheric motor control (interhemispheric motor control ), motor efference (motor overflow)  Executive Impairment: thought disorder  Motor Coordination: gait imbalance (tiptoes)  Movement Disorder (Gait): strength (difficulty rising), abnormal features (stance, speed, stride/cycle, abnormal swing, difficulty turning), dorsiflexion weakness (heel walking), gait syndrome (rigid-akinetic)  Movement Disorder (Hyperkinetic): tremor (postural)  Movement Disorder (Hypokinetic): parkinsonism (diminished facial expression, tone, resting tremor, bradykinesia), dyskinesia (slowing, hypometria, dysrhythmia)  Movement Disorder (Ocular): eyelid apraxia  Movement Disorder (Speech): abnormal vocal features (volume, rate)  Sensory Dysfunction: peripheral (A? fibers, A?/C fibers)  The neurocognitive battery is significant (based on age and education) for:  Executive predominant multi domain mild cognitive impairment  Mild Visuospatial Impairment: visuospatial construction, simultanagnosia, prosopagnosia.  Mild Executive Impairment: She scored >1.5 standard deviations below the norm on at least one measure. She had difficulty with lexical fluency, semantic fluency, working memory.  Very Mild Attention Impairment: orientation.  MMSE 27/30: MMSE Score suggestive of normal to questionable cognitive impairment.  MOCA 26/30: MOCA Score suggestive of mild cognitive impairment.  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     The patient's clinical presentation meets the criteria for Mild Cognitive Impairment (MCI-ADRC) (Reese MS, et al. 2011 Alzheimer's & Dementia).     Concern regarding an intraindividual change in cognition  Impairment in one or more cognitive domains  Preservation of independence in functional abilities.  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  A progressive cognitive decline that interferes with normal social or occupational function.  Prominent or persistent memory impairment may not necessarily occur in the early stages but is usually evident with progression.  Deficits on tests of attention, executive function, and visuospatial ability may be especially prominent.  Spontaneous motor features of parkinsonism.     At present, all neurodegenerative diseases can only be diagnosed with 100% certainty through a brain autopsy. The suspected neuropathology underlying the patient's neurocognitive impairment is likely a mixture of pathologies (Alzheimer's Disease Related Pathology, Lewy body disease/alpha-synucleinopathy, Vascular Contributions to Cognitive Impairment and Dementia).  There are no plasma protein biomarkers available on record.  There are no CSF protein biomarkers available on record.  There are no dermatological protein biomarkers available on record.  There is no relevant genetic biomarkers available on record.     The patient presented with a two-year history of gradually progressive executive impairment, accompanied by new motor deficits indicative of parkinsonism. Neurocognitive testing revealed a mild cognitive impairment with a predominance in executive functioning. 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 While the patient does not currently meet the diagnostic criteria for Lewy body dementia, the combination of  signs and symptoms raises concerns for the early stages of a Lewy body mediated neurocognitive disorder.In light of these findings, additional diagnostic testing is recommended to further evaluate the patient's condition. This testing could include, but is not limited to, brain imaging (such as MRI or PET scans), blood tests to rule out other causes of symptoms, and possibly a sleep study given the suspected sleep apnea.Moreover, it is advisable to focus on physical therapy to address the increasing risk of falls. This is particularly important given the patient's motor deficits and the potential instability associated with parkinsonism. Physical therapy can help improve balance, strength, and coordination, which are crucial for reducing fall risk.Given the signs and symptoms consistent with sleep apnea, a sleep disorder evaluation is also recommended. Sleep apnea can significantly impact cognitive and physical health, and its management may involve lifestyle changes, the use of a continuous positive airway pressure (CPAP) machine, or other interventions depending on the severity and type of sleep apnea.Overall, a multidisciplinary approach, involving neurology, physical therapy, and possibly sleep medicine, is essential for the comprehensive care and support of the patient. This approach aims not only to diagnose and understand the underlying neurocognitive disorder but also to manage its symptoms and improve the patient's quality of life.     The observations made above, were discussed with the patient and their supporting historian(s) (daughter). We have discussed the additional diagnostic(s) and/or managenent below.     Care Management Plan:    #Diagnostic Screening for reversible forms of neurocognitive disorders  We recommend screening for reversible causes of neurocognitive impairment with plasma laboratories  We have ordered plasma CBC, CMP, Vitamins (B1, B9, B12), Mg, RPR, MMA, TSH, T4, Nfl  We recommend  screening for anatomical CNS lesions/neurodegenerative patterns  We have ordered an MRI brain without contrast - Dementia Protocol  #Diagnostic Screening for cardiac conduction impairment before initiation of acetylcholinesterase inhibitor medication.  We have placed an order for a echocardiogram  #Diagnostic Screening for measurable forms of neurodegenerative pathology.  We have discussed opportunities for biomarker testing (CSF Fry biomarkers, IDEAs Amyloid-PET, Syn-One skin biopsy) - we provided reading materials. Next appointment likely recommend up a synuclein testing depending on blood based biomarkers  #Optimize Neurocognitive Impairment and Quality  We have discussed the MIND Diet and other lifestyle behavior that may help maintain brain health.  We have provided written/digital reading material  Continue donepezil 10 mg  #Optimize Behavioral Management and Quality.  Continue Namenda 10 mg b.i.d.  #Optimize Sleep Hygiene and Quality  We discussed and recommended additional diagnostic/management of sleep disorder to optimize brain health and longevity.  We have placed referrals to sleep medicine due to signs and symptoms suggestive of sleep apnea.  #Optimize Movement Disorder and Quality.  We have referred to Neurology-specific physical therapy/occupational therapy.  #Behavioral/Environmental Strategies  We recommend engaging in activities that stimulate cognitively and socially while avoiding excessive stimulation and fatigue in overwhelmingly complex situations.  We recommend integrating routine and schedule into your daily life. https://www.alzheimersproject.org/news/the-importance-of-routine-and-familiarity-to-persons-with-dementia/  #Health Maintenance/Lifestyle Advice  We have discussed the value in aggressively controlling vascular risk factors like hypertension, hyperlipidemia, and Diabetes SBP<130, LDL<100, A1C<7.0.  We discussed the need to optimize lifestyle choices including a heart-healthy diet  "(e.g., Mediterranean or DASH), increased cardiovascular exercise (goal 150 minutes of moderate-intensity per week), and stay cognitively and socially active.  We recommend the MIND diet, a combination of two healthy diets: the Mediterranean diet and the DASH (Dietary Approaches to Stop Hypertension) diet, and includes a variety of brain-friendly foods to optimize cognitive health and longevity.  #Support  We all need support sometimes. Get easy access to local resources, community programs, and services. https://www.communityresourcefinder.org/  Learn more about Cognitive Impairment in [place]: https://www.alz.org/professionals/public-health/state-overview/[place]  #Safety  The Alzheimer's Association administers the nationwide "Safe Return" program with identification bracelets, necklaces, or clothing tags and 24-hour assistance. More information is available online at https://www.alz.org/help-support/caregiving/safety/medicalert-with-24-7-wandering-support  #Follow up:  Follow-up in 4 weeks (Feb 2024).    Thank you for allowing us to participate in the care of your patient. Please do not hesitate to contact us with any questions or concerns.     It was a pleasure seeing The patient and we look forward to seeing them at their follow-up visit.     This note is dictated on M*Modal Fluency Direct word recognition program. There are word recognition mistakes that are occasionally missed on review.       Scheduled Follow-up :  No future appointments.    After Visit Medication List :     Medication List            Accurate as of January 4, 2024 11:53 AM. If you have any questions, ask your nurse or doctor.                CONTINUE taking these medications      aspirin 81 mg Cap     atorvastatin 10 MG tablet  Commonly known as: LIPITOR     CALCIUM 300 ORAL     co-enzyme Q-10 30 mg capsule     donepeziL 10 MG tablet  Commonly known as: ARICEPT     memantine 10 MG Tab  Commonly known as: NAMENDA     multivitamin per " tablet  Commonly known as: [employer]              Signing Physician:  Ceasar Faulkner MD    Billing:       -----------------------------------------------------------------------------  I spent a total of 90 minutes (time-in: 11:00 AM; time-out: 12:30 PM) on 2024-01-04, in person face-to-face with the patient and caregiver(s), >50% of that time was spent counseling regarding the symptoms, treatment plan, risks, therapeutic options, lifestyle modifications, and/or safety issues for the diagnoses above.  10/14 Review of Systems completed and is negative except as stated above in HPI (Systems reviewed: Const, Eyes, ENT, Resp, CV, GI, , MSK, Skin, Neuro)  I reviewed previous labs for a total of 5 minutes on 2024-01-04. This is directly related to the face-to-face encounter. Review of previous labs was performed all negative except as stated above in HPI  I independently reviewed radiological imaging, specimen, and tracing for a total of 5 minutes on 2024-01-04. This is directly related to the face-to-face encounter. Independent review of radiological imaging, specimen, and tracing was noted to be within normal limits except as stated above in HPI  I reviewed the summation of records from outside physicians for a total of 5 minutes on 2024-01-04. Reviewed and summation of records from an outside physician was performed as summarized above in HPI  I performed a neurobehavioral status examination that included a clinical assessment of thinking, reasoning, and judgment to ensure a comprehensive approach in managing the complex and evolving needs of the patient's neurocognitive condition. Please see above HPI and ROS for full details. This exam was performed on 2024-01-04 and included 12 minutes spent on direct face-to-face clinical observation and interview with the patient and 23 minutes spent interpreting test results and preparing the report. The total time of 35 minutes spent on the neurobehavioral status examination  is not included in the time spent on evaluation and management coding.  I conducted a comprehensive neuropsychological evaluation on 2024-01-04 in response to reported concerns of a discernible deviation from the patient's previously estimated cognitive functioning levels. The goal of this evaluation was to gauge these changes and their impact on the patient's daily life. This assessment involved administering a series of standardized neurocognitive tests, which are critical in objectively measuring various cognitive functions such as memory, attention, executive functions, visuospatial skills, and language. These tests were carefully chosen based on the patient's presenting symptoms and medical history to ensure an accurate assessment of their current cognitive functioning. Informed consent was duly obtained from the patient prior to administering these tests. The face-to-face administration of these tests took 11 minutes of direct interaction with the patient. Additionally, I spent 24 minutes on interpreting the standardized test results within the broader context of the patient's overall clinical presentation. Developing a tailored treatment plan involved integrating these findings with the patient's medical history, symptomatology, and other available data to develop a comprehensive understanding of their neuropsychological status. Feedback was provided to the patient and their caregiver, discussing the implications of the test findings and outlining recommended next steps. The total duration of this neuropsychological evaluation was 35 minutes. It is important to note that the time spent on this evaluation is distinctly separate from any evaluation and management services provided on the same day. The detailed findings, interpretations, and recommendations from this assessment are thoroughly documented in the neuropsychological assessment report above. This comprehensive cognitive assessment is essential for  establishing a clear cognitive baseline, accurately diagnosing the patient's condition, facilitating targeted interventions and personalized care plans, and providing a basis for ongoing monitoring and adjustment of the care plan.  Total Billing time spent on encounter/documentation for this patient's evaluation and management, not including the neurobehavioral status examination and neuropsychological evaluation: 82 minutes.

## 2024-01-05 ENCOUNTER — TELEPHONE (OUTPATIENT)
Dept: NEUROLOGY | Facility: CLINIC | Age: 79
End: 2024-01-05
Payer: MEDICARE

## 2024-01-05 PROBLEM — R41.3 MEMORY IMPAIRMENT: Status: RESOLVED | Noted: 2022-10-03 | Resolved: 2024-01-05

## 2024-01-05 PROBLEM — I61.9 ICH (INTRACEREBRAL HEMORRHAGE): Status: ACTIVE | Noted: 2024-01-05

## 2024-01-05 PROBLEM — G31.84 MCI (MILD COGNITIVE IMPAIRMENT): Status: ACTIVE | Noted: 2022-10-03

## 2024-01-05 PROBLEM — R41.89 SUBJECTIVE COGNITIVE IMPAIRMENT: Status: RESOLVED | Noted: 2022-10-03 | Resolved: 2024-01-05

## 2024-01-05 PROBLEM — R41.89 SUBJECTIVE COGNITIVE IMPAIRMENT: Status: ACTIVE | Noted: 2022-10-03

## 2024-01-05 PROBLEM — I61.9 ICH (INTRACEREBRAL HEMORRHAGE): Status: RESOLVED | Noted: 2024-01-05 | Resolved: 2024-01-05

## 2024-01-05 LAB
TREPONEMA PALLIDUM IGG+IGM AB [PRESENCE] IN SERUM OR PLASMA BY IMMUNOASSAY: NONREACTIVE
VIT B12 SERPL-MCNC: 379 NG/L (ref 180–914)

## 2024-01-05 NOTE — TELEPHONE ENCOUNTER
----- Message from Judith Patiño sent at 1/5/2024  9:31 AM CST -----  Regarding: Advise  Contact: [phone]  Pt's daughter Rajni is calling to speak with Dr. Faulkner about mother's dr visit she had yesterday 1/4/2024. She stated she has a few questions to ask Dr. Faulkner about visit and regarding stroke. Please give pt's daughter a call to discuss.

## 2024-01-08 LAB
CHOLEST SERPL-MCNC: 231 MG/DL
HDL SERPL QN: 9.8 NM
HDL SERPL-SCNC: >41 UMOL/L
HDLC SERPL-MCNC: 95 MG/DL (ref 40–59)
HLD.LARGE SERPL-SCNC: 15.4 UMOL/L
LDL SERPL QN: 21.4 NM
LDL SERPL-SCNC: 1466 NMOL/L
LDL SMALL SERPL-SCNC: <165 NMOL/L
LDLC SERPL CALC-MCNC: 110 MG/DL
NEUROFILAMENT LIGHT CHAIN, PLASMA: 31.5 PG/ML
PATHOLOGY STUDY: ABNORMAL
TRIGL SERPL-MCNC: 129 MG/DL (ref 30–149)
VLDL LARGE SERPL-SCNC: 1.7 NMOL/L
VLDL SERPL QN: 42.3 NM

## 2024-01-09 LAB
MANGANESE, SERUM: 0.7 NG/ML (ref 0.5–1.2)
METHYLMALONATE SERPL-SCNC: 0.15 UMOL/L
METHYLMALONATE SERPL-SCNC: 0.19 NMOL/ML
PHOSPHO-TAU (181P): 1.36 PG/ML (ref 0–0.97)

## 2024-01-10 LAB
COPPER SERPL-MCNC: 1116 UG/L (ref 810–1990)
VIT B1 BLD-MCNC: 91 UG/L (ref 38–122)

## 2024-01-12 LAB
2-KETOBUTYRIC ACID: 2.4 NMOL/ML
2ME3OH-BUTYRATE SERPL-SCNC: <0.1 NMOL/ML
2OXO3ME-VALERATE SERPL-SCNC: 12.1 NMOL/ML
2OXOISOCAPROATE SERPL-SCNC: 19.8 NMOL/ML
2OXOISOVALERATE SERPL-SCNC: 7.9 NMOL/ML
3ME-GLUTACONATE SERPL-SCNC: 1.1 NMOL/ML
3OH-ISOVALERATE SERPL-SCNC: 0.7 NMOL/ML
3OH-PROPIONATE PLAS-SCNC: 2.5 NMOL/ML
A-KETOGLUT SERPL-SCNC: 9.4 NMOL/ML
A-OH-BUTYR SERPL-SCNC: 27.8 NMOL/ML
ACONITATE SERPL-SCNC: <1.1 NMOL/ML
B-OH-BUTYR SERPL-SCNC: 137.4 NMOL/ML
B12 DEF, 2-METHYLCITRIC ACID: 0.7 NMOL/ML
FUMARATE SERPL-SCNC: 2.7 NMOL/ML
LACTATE SERPL-SCNC: 1395.2 NMOL/ML
MALATE SERPL-SCNC: 7.2 NMOL/ML
MITOCHONDRIAL METABOLITES INTERPRETATION: NORMAL
PROVIDER SIGNING NAME: NORMAL
PYRUVATE SERPL-SCNC: 68.6 NMOL/ML
SUCCINATE SERPL-SCNC: 4.2 NMOL/ML

## 2024-01-12 RX ORDER — ACETAMINOPHEN, DIPHENHYDRAMINE HCL, PHENYLEPHRINE HCL 325; 25; 5 MG/1; MG/1; MG/1
TABLET ORAL
Qty: 30 TABLET | Refills: 3 | Status: SHIPPED | OUTPATIENT
Start: 2024-01-12

## 2024-01-17 ENCOUNTER — TELEPHONE (OUTPATIENT)
Dept: NEUROLOGY | Facility: CLINIC | Age: 79
End: 2024-01-17
Payer: MEDICARE

## 2024-01-17 NOTE — TELEPHONE ENCOUNTER
----- Message from Ceasar Faulkner MD sent at 1/12/2024  1:51 PM CST -----  Hi,    Please contact the patient and let them know that their labs were fine aside from a B12 deficiency. I have sent a prescription to their pharmacy.    Ceasar Badillo

## 2024-01-31 ENCOUNTER — OFFICE VISIT (OUTPATIENT)
Dept: SLEEP MEDICINE | Facility: CLINIC | Age: 79
End: 2024-01-31
Payer: MEDICARE

## 2024-01-31 DIAGNOSIS — R06.83 SNORING: ICD-10-CM

## 2024-01-31 DIAGNOSIS — G31.84 MCI (MILD COGNITIVE IMPAIRMENT): ICD-10-CM

## 2024-01-31 DIAGNOSIS — G47.10 HYPERSOMNOLENCE: Primary | ICD-10-CM

## 2024-01-31 PROCEDURE — 99213 OFFICE O/P EST LOW 20 MIN: CPT | Mod: 95,,, | Performed by: PHYSICIAN ASSISTANT

## 2024-01-31 NOTE — PROGRESS NOTES
The chief complaint leading to consultation is: sleep problems    Visit type: audiovisual     The patient location is: [place]    15 minutes of total time spent on the encounter, which includes face to face time and non-face to face time preparing to see the patient (eg, review of tests), Obtaining and/or reviewing separately obtained history, Documenting clinical information in the electronic or other health record, Independently interpreting results (not separately reported) and communicating results to the patient/family/caregiver, or Care coordination (not separately reported).     Each patient to whom he or she provides medical services by telemedicine is:  (1) informed of the relationship between the physician and patient and the respective role of any other health care provider with respect to management of the patient; and (2) notified that he or she may decline to receive medical services by telemedicine and may withdraw from such care at any time.          Referred by Ceasar Faulkner MD     NEW PATIENT VISIT    Viji Santana  is a pleasant 78 y.o. female  with PMH significant for HLD, OA, MCI, former smoker, BMI 32+ who presents for sleep evaluation         C/o excessive daytime sleepiness and snoring. Is also having some recent memory issues. Her neurologist recommended evaluation for JOSELO, which is why she presents today.  Daughter joins visit via phone and helps provide history.       SLEEP SCHEDULE   Environment    Bed Time MN   Sleep Latency 20mins   Arousals 0   Nocturia 0   Back to sleep    Wake time 7AM   Naps 3 naps daily   Work            1/31/2024     5:29 PM   Sleep Clinic ROS    Difficulty breathing through the nose?  No   Sore throat or dry mouth in the morning? No   Irregular or very fast heart beat?  No   Shortness of breath?  No   Acid reflux? No   Body aches and pains?  No   Morning headaches? No   Dizziness? No   Mood changes?  No   Do you exercise?  No   Do you feel like moving your  legs a lot?  No     Denies sx of narcolepsy such as : H/H hallucinations, cataplexy, sleep attacks  Denies sx of RLS  Denies sleep walking, dream enactment, or sleep paralysis     Past Medical History:   Diagnosis Date    Hyperlipidemia     Memory impairment      Patient Active Problem List   Diagnosis    Hyperlipidemia    MCI (mild cognitive impairment)    Heart murmur    Osteoarthritis       Current Outpatient Medications:     aspirin 81 mg Cap, Take by mouth., Disp: , Rfl:     atorvastatin (LIPITOR) 10 MG tablet, Take 10 mg by mouth., Disp: , Rfl:     calcium carbonate (CALCIUM 300 ORAL), Take by mouth., Disp: , Rfl:     co-enzyme Q-10 30 mg capsule, Take 30 mg by mouth 3 (three) times daily., Disp: , Rfl:     cyanocobalamin, vitamin B-12, 5,000 mcg Subl, Place one under tongue once a day for 1 month, then continue to take under tongue per week, Disp: 30 tablet, Rfl: 3    donepeziL (ARICEPT) 10 MG tablet, Take 10 mg by mouth., Disp: , Rfl:     memantine (NAMENDA) 10 MG Tab, Take 10 mg by mouth 2 (two) times daily., Disp: , Rfl:     multivitamin (THERAGRAN) per tablet, Take 1 tablet by mouth once daily., Disp: , Rfl:      There were no vitals filed for this visit.    Physical Exam:    GEN:   Well-appearing  Psych:  Appropriate affect, demonstrates insight  SKIN:  No rash on the face or bridge of the nose      LABS:   Lab Results   Component Value Date    HGB 13.1 09/14/2023    CO2 27 01/04/2024         RECORDS REVIEWED:    No previous sleep study    ASSESSMENT        1/31/2024     5:26 PM   EPWORTH SLEEPINESS SCALE   Sitting and reading 2   Watching TV 2   Sitting, inactive in a public place (e.g. a theatre or a meeting) 0   As a passenger in a car for an hour without a break 3   Lying down to rest in the afternoon when circumstances permit 1   Sitting and talking to someone 0   Sitting quietly after a lunch without alcohol 1   In a car, while stopped for a few minutes in traffic 0   Total score 9       PROBLEM  DESCRIPTION/ Sx on Presentation  STATUS   sx JOSELO   + snoring, denies witnessed apneas    New   Daytime Sx   + sleepiness when inactive   Taking 3 naps daily  ESS 9/24 on intake  New   Insomnia   Trouble falling asleep: no issues  Arousals:         rarely wakes  Hard to get back to sleep?: no     Prior pertinent medications:  Current pertinent medications:   New   Other issues:     PLAN     -recommend sleep testing   -PSG ordered  -discussed trial therapy if JOSELO present and the patient is open to a trial of CPAP therapy  -discussed JOSELO and CPAP with patient in detail, including possible complications of untreated JOSELO like heart attack/stroke  -advised on strict driving precautions; advised never to drive drowsy    Advised on plan of care. Answered all patient questions. Patient verbalized understanding and voiced agreement with plan of care.       RTC if dx of JOSELO made and CPAP ordered, will need follow up 31-90 days after receiving machine for compliance        The patient was given open opportunity to ask questions and/or express concerns about treatment plan. All questions/concerns were discussed.     Two patient identifiers used prior to evaluation.

## 2024-02-01 ENCOUNTER — TELEPHONE (OUTPATIENT)
Dept: SLEEP MEDICINE | Facility: OTHER | Age: 79
End: 2024-02-01
Payer: MEDICARE

## 2024-02-09 ENCOUNTER — OFFICE VISIT (OUTPATIENT)
Dept: INTERNAL MEDICINE | Facility: CLINIC | Age: 79
End: 2024-02-09
Payer: MEDICARE

## 2024-02-09 ENCOUNTER — HOSPITAL ENCOUNTER (OUTPATIENT)
Dept: SLEEP MEDICINE | Facility: OTHER | Age: 79
Discharge: HOME | End: 2024-02-09
Attending: PSYCHIATRY & NEUROLOGY
Payer: MEDICARE

## 2024-02-09 ENCOUNTER — TELEPHONE (OUTPATIENT)
Dept: NEUROLOGY | Facility: CLINIC | Age: 79
End: 2024-02-09
Payer: MEDICARE

## 2024-02-09 ENCOUNTER — HOSPITAL ENCOUNTER (OUTPATIENT)
Dept: RADIOLOGY | Facility: OTHER | Age: 79
Discharge: HOME | End: 2024-02-09
Attending: PSYCHIATRY & NEUROLOGY
Payer: MEDICARE

## 2024-02-09 VITALS
BODY MASS INDEX: 33.87 KG/M2 | OXYGEN SATURATION: 99 % | HEART RATE: 97 BPM | DIASTOLIC BLOOD PRESSURE: 70 MMHG | WEIGHT: 197.31 LBS | SYSTOLIC BLOOD PRESSURE: 124 MMHG

## 2024-02-09 DIAGNOSIS — R41.3 OTHER AMNESIA: ICD-10-CM

## 2024-02-09 DIAGNOSIS — G47.10 HYPERSOMNOLENCE: ICD-10-CM

## 2024-02-09 DIAGNOSIS — R26.89 BALANCE PROBLEM: ICD-10-CM

## 2024-02-09 DIAGNOSIS — G31.84 MCI (MILD COGNITIVE IMPAIRMENT): Primary | ICD-10-CM

## 2024-02-09 DIAGNOSIS — R06.83 SNORING: ICD-10-CM

## 2024-02-09 DIAGNOSIS — M19.90 OSTEOARTHRITIS, UNSPECIFIED OSTEOARTHRITIS TYPE, UNSPECIFIED SITE: ICD-10-CM

## 2024-02-09 DIAGNOSIS — M79.89 LEG SWELLING: ICD-10-CM

## 2024-02-09 DIAGNOSIS — G31.84 MCI (MILD COGNITIVE IMPAIRMENT): ICD-10-CM

## 2024-02-09 PROCEDURE — 70551 MRI BRAIN STEM W/O DYE: CPT | Mod: 26,,, | Performed by: RADIOLOGY

## 2024-02-09 PROCEDURE — 95810 POLYSOM 6/> YRS 4/> PARAM: CPT

## 2024-02-09 PROCEDURE — 70551 MRI BRAIN STEM W/O DYE: CPT | Mod: TC

## 2024-02-09 PROCEDURE — 99213 OFFICE O/P EST LOW 20 MIN: CPT | Mod: PBBFAC,25

## 2024-02-09 PROCEDURE — 99214 OFFICE O/P EST MOD 30 MIN: CPT | Mod: S$PBB,GC,,

## 2024-02-09 PROCEDURE — 99999 PR PBB SHADOW E&M-EST. PATIENT-LVL III: CPT | Mod: PBBFAC,GC,,

## 2024-02-09 NOTE — TELEPHONE ENCOUNTER
Relayed message to patient's daughter. Instructed to follow up with PCP to rule out causes of feet swelling.

## 2024-02-09 NOTE — PROGRESS NOTES
INTERNAL MEDICINE RESIDENT CLINIC  CLINIC NOTE    Patient Name: Viji Santana  YOB: 1945    PRESENTING HISTORY       History of Present Illness:  Ms. Viji Santana is a 78 y.o. female with a medical history of mild cognitive impairment presents to clinic today with balance issues. Per daughter who provides much of the patients history, pt was started on vitamin b12 a couple weeks ago after she was noted to have a deficiency. Since that time, she has been having gait disturbances, sensory deficits, and worsening cognitive decline. Pt recently moved from [place] in September and is following currently with Neurology for alterations in mental status from baseline. Pt denies any falls with trauma/head trauma. No acute complaints including fever, CP, SOB, abdominal pain, n/v, urinary sxs.    Review of Systems   Constitutional:  Negative for chills and fever.   HENT: Negative.     Respiratory:  Negative for shortness of breath and wheezing.    Cardiovascular:  Positive for leg swelling. Negative for chest pain.   Gastrointestinal:  Negative for abdominal pain, constipation and diarrhea.   Genitourinary: Negative.    Musculoskeletal:  Negative for falls.   Skin: Negative.    Neurological:  Positive for sensory change and weakness. Negative for loss of consciousness.   Psychiatric/Behavioral:  Positive for memory loss.          PAST HISTORY:     Past Medical History:   Diagnosis Date    Hyperlipidemia     Memory impairment        Past Surgical History:   Procedure Laterality Date    EYE SURGERY  2012    Procedure to relieve eye pressure       Family History   Problem Relation Age of Onset    Hypertension Mother     Heart disease Mother     Hypertension Brother     Hypertension Brother     Alzheimer's disease Maternal Aunt        Social History     Socioeconomic History    Marital status: Unknown   Tobacco Use    Smoking status: Former     Current packs/day: 0.00     Average packs/day: 0.3  packs/day for 15.0 years (3.8 ttl pk-yrs)     Types: Cigarettes     Quit date: 1983     Years since quittin.1    Smokeless tobacco: Never    Tobacco comments:     Quit    Substance and Sexual Activity    Alcohol use: Yes     Alcohol/week: 2.0 standard drinks of alcohol     Types: 2 Glasses of wine per week    Drug use: Never    Sexual activity: Not Currently     Partners: Male     Birth control/protection: None       MEDICATIONS & ALLERGIES:     Current Outpatient Medications on File Prior to Visit   Medication Sig    aspirin 81 mg Cap Take by mouth.    atorvastatin (LIPITOR) 10 MG tablet Take 10 mg by mouth.    calcium carbonate (CALCIUM 300 ORAL) Take by mouth.    co-enzyme Q-10 30 mg capsule Take 30 mg by mouth 3 (three) times daily.    cyanocobalamin, vitamin B-12, 5,000 mcg Subl Place one under tongue once a day for 1 month, then continue to take under tongue per week    donepeziL (ARICEPT) 10 MG tablet Take 10 mg by mouth.    memantine (NAMENDA) 10 MG Tab Take 10 mg by mouth 2 (two) times daily.    multivitamin (THERAGRAN) per tablet Take 1 tablet by mouth once daily.     No current facility-administered medications on file prior to visit.       Review of patient's allergies indicates:  No Known Allergies    OBJECTIVE:   Vital Signs:  Vitals:    24 1332   BP: 124/70   Pulse: 97   SpO2: 99%   Weight: 89.5 kg (197 lb 5 oz)       No results found for this or any previous visit (from the past 24 hour(s)).      Physical Exam  Constitutional:       General: She is not in acute distress.  HENT:      Head: Normocephalic and atraumatic.      Nose: Nose normal.      Mouth/Throat:      Pharynx: Oropharynx is clear.   Eyes:      Extraocular Movements: Extraocular movements intact.      Conjunctiva/sclera: Conjunctivae normal.   Cardiovascular:      Rate and Rhythm: Normal rate and regular rhythm.      Pulses: Normal pulses.      Heart sounds: Normal heart sounds. No murmur heard.  Pulmonary:       Effort: Pulmonary effort is normal. No respiratory distress.      Breath sounds: Normal breath sounds. No wheezing.   Abdominal:      Tenderness: There is no abdominal tenderness.   Musculoskeletal:      Cervical back: Normal range of motion.      Right lower leg: Edema present.      Left lower leg: Edema present.   Skin:     General: Skin is warm and dry.   Neurological:      Mental Status: She is alert. Mental status is at baseline.      Sensory: Sensory deficit present.      Motor: Weakness present.      Coordination: Coordination abnormal.      Gait: Gait abnormal.   Psychiatric:         Mood and Affect: Mood normal.         Behavior: Behavior normal.           ASSESSMENT & PLAN:     1. MCI (mild cognitive impairment)  -     WALKER FOR HOME USE  -     Ambulatory referral/consult to Physical/Occupational Therapy; Future; Expected date: 02/16/2024  - Pt to follow up with Neurology, getting MRI later this afternoon    2. Leg swelling  -     B-TYPE NATRIURETIC PEPTIDE; Future; Expected date: 02/09/2024  -  No hx of CHF and absence of pertinent symptoms that would suggest an exacerbation of HF: no orthopnea or crackles on lung exam    3. Balance problem    The mobility limitation cannot be sufficiently resolved by the use of a cane. Furthermore, the patient is unable to, and therefore cannot use a cane due to her medical health. Patient's functional mobility deficit can be sufficiently resolved with the use of a (Rolling Walker, Rollator or Walker). Patient's mobility limitation significantly impairs their ability to participate in one of more activities of daily living. The use of a (Rolling Walker, Rollator or Walker) will significantly improve the patient's ability to participate in MRADLS and the patient will use it on regular basis in the home.     Orders:  -     WALKER FOR HOME USE  -     Ambulatory referral/consult to Physical/Occupational Therapy; Future; Expected date: 02/16/2024    4.  Osteoarthritis    Patient is unable to use a cane due to her medical health which is significant for Osteoarthritis    -Orders: Walker for home use      Discussed with Dr. Carol Whittaker MD  Internal Medicine PGY-1  [facility]

## 2024-02-09 NOTE — TELEPHONE ENCOUNTER
"----- Message from Janice Remy sent at 2/9/2024  8:05 AM CST -----  Regarding: same day appt request  PATIENT CALL    Pt's daughter Rajni called, states that her mom has been having issues since starting B12 2 weeks ago. States that she's been having balance issues, foot swelling, and generally "doesn't feel like herself". Please call back at [phone], they'd like to be seen today    "

## 2024-02-09 NOTE — TELEPHONE ENCOUNTER
Spoke with daughter of the patient and the following was discussed:    Daughter states that patient was prescribed was B12 and recently had 'adverse' effect. Symptoms included swollen bilateral feet/legs, off balance-shuffling, swaying right>left.    Patient also fell this morning prior to call. Denied concussion. No injuries.    Patient will have PCP same day appointment today at 1pm. MRI at 4:30pm and sleep study at 7:30pm.    Daughter concerns if a follow up is necessary. Patient lives in [place] and is in town today.

## 2024-02-10 NOTE — PROGRESS NOTES
Viji Santana to [facility] for an overnight sleep study on 02/09/2024. Pt. education,setup and cpap explanation given prior to testing. Disposable leads and sensors used where applicable.  Diagnostic study completed.  Post test followup information given in AM.

## 2024-02-11 ENCOUNTER — TELEPHONE (OUTPATIENT)
Dept: NEUROLOGY | Facility: CLINIC | Age: 79
End: 2024-02-11
Payer: MEDICARE

## 2024-02-11 ENCOUNTER — OFFICE VISIT (OUTPATIENT)
Dept: URGENT CARE | Facility: CLINIC | Age: 79
End: 2024-02-11
Payer: MEDICARE

## 2024-02-11 VITALS
OXYGEN SATURATION: 96 % | HEART RATE: 99 BPM | TEMPERATURE: 98 F | SYSTOLIC BLOOD PRESSURE: 116 MMHG | WEIGHT: 197 LBS | RESPIRATION RATE: 18 BRPM | BODY MASS INDEX: 33.63 KG/M2 | HEIGHT: 64 IN | DIASTOLIC BLOOD PRESSURE: 73 MMHG

## 2024-02-11 DIAGNOSIS — R60.9 DEPENDENT EDEMA: ICD-10-CM
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
## 2024-02-11 DIAGNOSIS — R39.89 SUSPECTED UTI: ICD-10-CM

## 2024-02-11 LAB
BILIRUB UR QL STRIP: NEGATIVE
GLUCOSE UR QL STRIP: NEGATIVE
KETONES UR QL STRIP: NEGATIVE
LEUKOCYTE ESTERASE UR QL STRIP: NEGATIVE
PH, POC UA: 5.5 (ref 5–8)
POC BLOOD, URINE: NEGATIVE
POC NITRATES, URINE: NEGATIVE
PROT UR QL STRIP: POSITIVE
SP GR UR STRIP: 1.02 (ref 1–1.03)
UROBILINOGEN UR STRIP-ACNC: ABNORMAL (ref 0.1–1.1)

## 2024-02-11 PROCEDURE — 81003 URINALYSIS AUTO W/O SCOPE: CPT | Mod: QW,S$GLB,, | Performed by: NURSE PRACTITIONER

## 2024-02-11 PROCEDURE — 99203 OFFICE O/P NEW LOW 30 MIN: CPT | Mod: S$GLB,,, | Performed by: NURSE PRACTITIONER

## 2024-02-11 PROCEDURE — 87077 CULTURE AEROBIC IDENTIFY: CPT | Performed by: NURSE PRACTITIONER

## 2024-02-11 PROCEDURE — 87088 URINE BACTERIA CULTURE: CPT | Performed by: NURSE PRACTITIONER

## 2024-02-11 PROCEDURE — 87186 SC STD MICRODIL/AGAR DIL: CPT | Performed by: NURSE PRACTITIONER

## 2024-02-11 PROCEDURE — 87086 URINE CULTURE/COLONY COUNT: CPT | Performed by: NURSE PRACTITIONER

## 2024-02-11 NOTE — TELEPHONE ENCOUNTER
----- Message from Ceasar Faulkner MD sent at 2/10/2024 11:02 AM CST -----  Hi,  Please schedule the patient for a video follow-up to discuss test results and care management.  Ceasar Badillo

## 2024-02-11 NOTE — PATIENT INSTRUCTIONS
Elevated legs to help with ankle/feet swelling  Change positions slowly to avoid light-headedness  Remove rugs or any objects that may be in the way, to help prevent future falls  Wear wide and comfortable shoes   Decrease salt in diet  Use walking-assisted devices (cane or walker)   Stay well hydrated and eat well   Follow up with primary care

## 2024-02-11 NOTE — PROGRESS NOTES
"Subjective:      Patient ID: Viji Santana is a 78 y.o. female.    Vitals:  height is 5' 4" (1.626 m) and weight is 89.4 kg (197 lb). Her oral temperature is 98.4 °F (36.9 °C). Her blood pressure is 116/73 and her pulse is 99. Her respiration is 18 and oxygen saturation is 96%.     Chief Complaint: Dysuria    This is a 78 y.o. female who presents today with a chief complaint of "pt has had a couple of falls in the last couple of days-weeks."  Pt is here with family member who states that she believes her equilibrium is off. Pt denies any light-headedness or dizziness. Denies fever, cough, congestion, and feeling ill. She lives in a assisted-living facility, who suggested she come here to rule out a UTI.  They deny any new confusion, but pt does have a h/o memory loss and mild cognitive impariment. Pt denies abdominal and flank pain. Denies chest and back pain. Reports some left knee pain after the fall. And reports new swelling to both legs and ankles over the last couple of weeks.     Dysuria   Associated symptoms include behavior changes. Pertinent negatives include no flank pain, frequency or urgency.       Constitution: Negative for fatigue and fever.   HENT:  Negative for congestion.    Respiratory:  Negative for cough.    Gastrointestinal:  Negative for abdominal pain.   Genitourinary:  Negative for dysuria, frequency, urgency and flank pain.   Musculoskeletal:  Positive for pain (left knee). Negative for back pain.   Skin:  Negative for color change, skin thickening/induration and erythema.   Neurological:  Positive for loss of balance. Negative for dizziness, light-headedness, facial drooping, speech difficulty, coordination disturbances, headaches, disorientation, altered mental status and loss of consciousness.   Psychiatric/Behavioral:  Negative for altered mental status and disorientation.       Objective:     Physical Exam   Constitutional: She is oriented to person, place, and time.  Non-toxic " appearance. She does not appear ill. No distress.   HENT:   Head: Normocephalic.   Ears:   Right Ear: Tympanic membrane, external ear and ear canal normal.   Left Ear: Tympanic membrane, external ear and ear canal normal.   Nose: Nose normal.   Mouth/Throat: Mucous membranes are moist. No oropharyngeal exudate or posterior oropharyngeal erythema. Oropharynx is clear.   Eyes: Conjunctivae are normal. Right eye exhibits no discharge. Left eye exhibits no discharge. No scleral icterus.   Neck: Neck supple. No neck rigidity present.   Cardiovascular: Normal rate, regular rhythm and normal pulses.   Pulmonary/Chest: Effort normal and breath sounds normal.   Abdominal: Normal appearance and bowel sounds are normal. She exhibits no distension. Soft. flat abdomen There is no abdominal tenderness. There is no rebound, no guarding, no left CVA tenderness and no right CVA tenderness.   Musculoskeletal: Normal range of motion.         General: Normal range of motion.      Right lower leg: Edema present.      Left lower leg: Edema present.      Comments: Mild edema to bilateral ankles and feet. Pedal pulses 2+. No edema or color change to lower legs. Lower legs are soft to the touch and equal in size. No erythema or bruising to knees. No pain to palpation of the knees.    Neurological: She is alert and oriented to person, place, and time. She displays no weakness. Gait normal.   Skin: Skin is warm, dry and not diaphoretic. No erythema   Psychiatric: Her behavior is normal. Mood normal.   Nursing note and vitals reviewed.    Results for orders placed or performed in visit on 02/11/24   POCT Urinalysis, Dipstick, Automated, W/O Scope   Result Value Ref Range    POC Blood, Urine Negative Negative    POC Bilirubin, Urine Negative Negative    POC Urobilinogen, Urine NORM 0.1 - 1.1    POC Ketones, Urine Negative Negative    POC Protein, Urine Positive (A) Negative    POC Nitrates, Urine Negative Negative    POC Glucose, Urine Negative  Negative    pH, UA 5.5 5 - 8    POC Specific Gravity, Urine 1.020 1.003 - 1.029    POC Leukocytes, Urine Negative Negative      Assessment:     1. Fall, initial encounter    2. Suspected UTI    3. Dependent edema        Plan:       Fall, initial encounter    Suspected UTI  -     POCT Urinalysis, Dipstick, Automated, W/O Scope  -     CULTURE, URINE    Dependent edema      Patient Instructions   Elevated legs to help with ankle/feet swelling  Change positions slowly to avoid light-headedness  Remove rugs or any objects that may be in the way, to help prevent future falls  Wear wide and comfortable shoes   Decrease salt in diet  Use walking-assisted devices (cane or walker)   Stay well hydrated and eat well   Follow up with primary care

## 2024-02-12 ENCOUNTER — TELEPHONE (OUTPATIENT)
Dept: NEUROLOGY | Facility: CLINIC | Age: 79
End: 2024-02-12

## 2024-02-13 LAB — BACTERIA UR CULT: ABNORMAL

## 2024-02-13 NOTE — TELEPHONE ENCOUNTER
----- Message from Judith Patiño sent at 2/12/2024  9:30 AM CST -----  Regarding: MRI Results  Contact: [phone]  Pt's daughter Rajni is calling to discuss pt's MRI results from MRI that was done on Friday 2/9/24. Pt's daughter would like a call to discuss results and next steps. Please give her a call back to discuss results.

## 2024-02-13 NOTE — TELEPHONE ENCOUNTER
----- Message from Gallo Pryor sent at 2/12/2024  1:54 PM CST -----  Regarding: Results needed  Contact: Rajni (daughter) @ [phone]  Pts daughter Rajni is requesting a call back from Eze in regards to the results from the pts MRI and plan of care. Please call Rajni to discuss further

## 2024-02-13 NOTE — TELEPHONE ENCOUNTER
----- Message from Judith Patiño sent at 2/12/2024  9:30 AM CST -----  Regarding: MRI Results  Contact: [phone]  Pt's daughter Rajni is calling to discuss pt's MRI results from MRI that was done on Friday 2/9/24. Pt's daughter would like a call to discuss results and next steps. Please give her a call back to discuss results.

## 2024-02-14 ENCOUNTER — TELEPHONE (OUTPATIENT)
Dept: URGENT CARE | Facility: CLINIC | Age: 79
End: 2024-02-14
Payer: MEDICARE

## 2024-02-14 ENCOUNTER — TELEPHONE (OUTPATIENT)
Dept: NEUROLOGY | Facility: CLINIC | Age: 79
End: 2024-02-14
Payer: MEDICARE

## 2024-02-14 ENCOUNTER — TELEPHONE (OUTPATIENT)
Dept: INTERNAL MEDICINE | Facility: CLINIC | Age: 79
End: 2024-02-14
Payer: MEDICARE

## 2024-02-14 ENCOUNTER — TELEPHONE (OUTPATIENT)
Dept: INTERNAL MEDICINE | Facility: CLINIC | Age: 79
End: 2024-02-14

## 2024-02-14 DIAGNOSIS — N39.0 URINARY TRACT INFECTION WITHOUT HEMATURIA, SITE UNSPECIFIED: Primary | ICD-10-CM

## 2024-02-14 RX ORDER — AMOXICILLIN AND CLAVULANATE POTASSIUM 875; 125 MG/1; MG/1
1 TABLET, FILM COATED ORAL EVERY 12 HOURS
Qty: 14 TABLET | Refills: 0 | Status: SHIPPED | OUTPATIENT
Start: 2024-02-14 | End: 2024-02-21

## 2024-02-14 NOTE — TELEPHONE ENCOUNTER
I spoke to the patient's daughter daughter. She is stating that the patient cannot get her walker per DME   Because there is missing information.  I will need to call DME and inquire about what is needed.

## 2024-02-14 NOTE — TELEPHONE ENCOUNTER
----- Message from Armaanmag Porrasry sent at 2/14/2024 10:01 AM CST -----  Type:  Patient Returning Call    Who Called:pt daughter sarah   Who Left Message for Patient:  Does the patient know what this is regarding?:pt advice  Would the patient rather a call back or a response via [facility]? Call   Best Call Back Number: [phone]   Additional Information: pt daughter is calling in regards to the orders that were sent over for the pt to get an walker, the pt daughter states that the company needs more information before the pt can get the walker. Please give the pt daughter an call back as soon as possible

## 2024-02-14 NOTE — TELEPHONE ENCOUNTER
Spoke with patient's daughter, Rajni.    Scheduled virtual video appt on 2/20/24 at 3:15pm. Rajni asks if possible to do audio call as she is in [place] while Ms. Santana is in [facility].    Informed patient that message will be sent to MD and will advise if appropriate to change appt type from virtual to audio. Pt not tech efficient

## 2024-02-14 NOTE — TELEPHONE ENCOUNTER
----- Message from Leonid Reaves MA sent at 2/14/2024  9:36 AM CST -----    ----- Message -----  From: Radha Mohamud FNP-BC  Sent: 2/14/2024   9:17 AM CST  To: [facility] Clinical Support; #    Negative urine culture result. May need to call family member listed on chart.  (Pt lives in assisted living home).     Spoke with pt's daughter who will  med for pt       ----- Message -----  From: Idania Rey MA  Sent: 2/11/2024   9:42 AM CST  To: #

## 2024-02-14 NOTE — TELEPHONE ENCOUNTER
The patient was seem at [facility] on 2/9.  The patient was seen for a recent fall.  A walker was ordered by Dr Whittaker.  Need more information.    I spoke to [facility]; NOTES NEED TO REFLEX THAT THE PATIENT CANNOT USE A CANE.    Ms. Brink can you assist with getting this corrected? I sent a message to Dr Whittaker.

## 2024-02-14 NOTE — TELEPHONE ENCOUNTER
----- Message from Leonid Reaves MA sent at 2/14/2024  9:36 AM CST -----    ----- Message -----  From: Radha Mohamud FNP-BC  Sent: 2/14/2024   9:17 AM CST  To: [facility] Clinical Support; #    Please inform of positive urine culture result. I will call abx in at this time.       ----- Message -----  From: Idania Rey MA  Sent: 2/11/2024   9:42 AM CST  To: #

## 2024-02-14 NOTE — TELEPHONE ENCOUNTER
----- Message from Yasmeen Rollins sent at 2024  4:44 PM CST -----  Regarding: returning missed call from Eze  Contact: Pt's daughter @[phone]  Pt is returning a missed call from someone in the office and is asking for a return call back soon. Thanks.         Reason for call: returning missed  call         Patient's DX: n/a         Patient requesting call back or [facility] [phone]

## 2024-02-14 NOTE — TELEPHONE ENCOUNTER
----- Message from Rosa M Brennan sent at 2/14/2024  2:42 PM CST -----  Contact: [phone]  PATIENTCALL     Pt is calling to speak with someone in provider office regarding she is calling to get the MRI results her mother did on Friday She is asking for a return call please call.

## 2024-02-15 NOTE — PROCEDURES
"[facility]    Polysomnography Interpretation Report    Patient Name:  Viji Santana  MRN#:  [id number]  :  1945  Study Date:  2024  Referring Provider:  LUIS MANUEL HOFF    Indications for Polysomnography:  The patient is a 78 year old Female who is 5' 4" and weighs 190.0 lbs.  Her BMI equals 32.8.  [place] was - and Neck Circumference was -.  A full night polysomnogram was performed to evaluate for -.    Polysomnogram Data  A full night polysomnogram recorded the standard physiologic parameters including EEG, EOG, EMG, EKG, nasal and oral airflow.  Respiratory parameters of chest and abdominal movements were recorded with (RIP) Respiratory Inductance Plethsmography.  Oxygen saturation was recorded by pulse oximetry.    Sleep Architecture  The total recording time of the polysomnogram was 455.1 minutes.  The total sleep time was 307.5 minutes.  The patient spent 17.6% of total sleep time in Stage N1, 59.8% in Stage N2, 0.0% in Stages N3, and 22.6% in REM.  Sleep latency was 1.6 minutes.  REM latency was 29.5 minutes.  Sleep Efficiency was 67.6%.  Wake after sleep onset was 146.0 minutes.    Respiratory Events  The polysomnogram revealed a presence of 4 obstructive, 1 central, and - mixed apneas resulting in a Total Apnea index of 1.0 events per hour.  There were 39 hypopneas resulting in a Total Hypopnea index of 7.6 events per hour.  The combined Apnea/Hypopnea index was 8.6 events per hour.  There were a total of 15 RERA events resulting in a Respiratory Disturbance Index (RDI) of 11.5 events per hour.     Mean oxygen saturation was 93.8%.  The lowest oxygen saturation during sleep was 88.0%.  Time spent ?88% oxygen saturation was 0.1 minutes (-).    Limb Activity  There were - limb movements recorded.      Cardiac:  single lead EKG revealed normal sinus rhythm     Oxygenation:  hypoxemia was only observed in relation to obstructive events    Impression:  -obstructive sleep " "apnea (REM-predominant with RAHI = 18 versus NRAHI = 5)    Recommendations:    -treatment for JOSELO seen in this study should be considered  -the patient has follow up with Sleep Medicine          Jeremy Johnson MD    (This Sleep Study was interpreted by a Board Certified Sleep Specialist who conducted an epoch-by-epoch review of the entire raw data recording.)  (The indication for this sleep study was reviewed and deemed appropriate by [facility] Practice Parameters or other reasons by a Board Certified Sleep Specialist.)    [facility]    Diagnostic PSG Report    Patient Name: Viji Santana Study Date: 2/9/2024   YOB: 1945 MRN #: [id number]   Age: 78 year QUIANA #: [id number]   Sex: Female Referring Provider: LUIS MANUEL HOFF   Height: 5' 4" Recording Tech: Angelanickolas Iveynet RRT RPSGT   Weight: 190.0 lbs Scoring Tech: Darius Stovall RRT RPSGT   BMI: 32.8 Interpreting Physician: -   ESS: - Neck Circumference: -     Study Overview    Lights Off: 09:42:33 PM  Count Index   Lights On: 05:17:40 AM Awakenings: 38 7.4   Time in Bed: 455.1 min. Arousals: 36 7.0   Total Sleep Time: 307.5 min. Apneas & Hypopneas: 44 8.6    Sleep Efficiency: 67.6% Limb Movements: - -   Sleep Latency: 1.6 min. Snores: - -   Wake After Sleep Onset: 146.0 min. Desaturations: 44 8.6    REM Latency from Sleep Onset: 29.5 min. Minimum SpO2 TST: 88.0%        Sleep Architecture   % of Time in Bed  Stages Time (mins) % Sleep Time   Wake 148.5    Stage N1 54.0 17.6%   Stage N2 184.0 59.8%   Stage N3 0.0 0.0%   REM 69.5 22.6%         Arousal Summary     NREM REM Sleep Index   Respiratory Arousals 5 10 15 2.9   PLM Arousals - - - -   Isolated Limb Movement Arousals - - - -   Spontaneous Arousals 18 3 21 4.1   Total 23 13 36 7.0       Limb Movement Summary     Count Index   Isolated Limb Movements - -   Periodic Limb Movements (PLMs) - -   Total Limb Movements - -         Respiratory Summary     By Sleep Stage By Body Position " Total    NREM REM Supine Non-Supine    Time (min) 238.0 69.5 307.5 - 307.5           Obstructive Apnea 1 3 4 - 4   Mixed Apnea - - - - -   Central Apnea - 1 1 - 1   Central Apnea Index - 0.9 - - -   Total Apneas 1 4 5 - 5   Total Apnea Index 0.3 3.5 1.0 - 1.0           Hypopnea 22 17 39 - 39   Hypopnea Index 5.5 14.7 7.6 - 7.6           Apnea & Hypopnea 23 21 44 - 44   Apnea & Hypopnea Index 5.8 18.1 8.6 - 8.6           RERAs 5 10 15 - 15   RERA Index 1.3 8.6 2.9 - 2.9           RDI 7.1 26.8 11.5 - 11.5     Scoring Criteria: Hypopneas scored at 4% desaturation criteria.    Respiratory Event Durations     Apnea Hypopnea    NREM REM NREM REM   Average (seconds) 13.2 11.3 15.5 15.3   Maximum (seconds) 13.2 12.4 19.3 20.0       Oxygen Saturation Summary     Wake NREM REM TST TIB   Average SpO2 94.5% 93.4% 93.8% 93.5% 93.8%   Minimum SpO2 88.0% 88.0% 89.0% 88.0% 88.0%   Maximum SpO2 99.0% 98.0% 97.0% 98.0% 99.0%       Oxygen Saturation Distribution    Range (%) Time in range (min) Time in range (%)   90.0 - 100.0 432.6 97.9%   80.0 - 90.0 8.7 2.0%   70.0 - 80.0 - -   60.0 - 70.0 - -   50.0 - 60.0 - -   0.0 - 50.0 - -   Time Spent ?88% SpO2    Range (%) Time in range (min) Time in range (%)   0.0 - 88.0 0.1 0.0%          Count Index   Desaturations 44 8.6      Cardiac Summary     Wake NREM REM Sleep Total   Average Pulse Rate (BPM) 94.1 84.8 86.3 85.2 87.9   Minimum Pulse Rate (BPM) 63.0 76.0 74.0 74.0 63.0   Maximum Pulse Rate (BPM) 205.0 98.0 97.0 98.0 205.0     Pulse Rate Distribution    Range (bpm) Time in range (min) Time in range (%)   0.0 - 40.0 - -   40.0 - 60.0 - -   60.0 - 80.0 30.9 7.0%   80.0 - 100.0 400.6 90.4%   100.0 - 120.0 11.5 2.6%   120.0 - 140.0 - -   140.0 - 200.0 - -     EtCO2 Summary    Stage Min (mmHg) Average (mmHg) Max (mmHg)   Wake - - -   NREM(1+2+3) - - -   REM - - -     Range (mmHg) Time in range (min) Time in range (%)   - - -   - - -   - - -   - - -   - - -     TcCO2 Summary    Stage Min  (mmHg) Average (mmHg) Max (mmHg)   Wake - - -   NREM(1+2+3) - - -   REM - - -     Range (mmHg) Time in range (min) Time in range (%)   20.0 - 40.0 - -   40.0 - 50.0 - -   50.0 - 55.0 - -   55.0 - 100.0 - -   Excluded data <20.0 & >65.0 456.0 100.0%     Comments    -

## 2024-02-16 NOTE — TELEPHONE ENCOUNTER
I see that you changed the notes. However,You will need to state that the patient is unable to use a cane due to her medical health.  You will need to add diagnosis Osteoarthritis . We can try adding this to the order. Please keep the other diagnosis.  Please revise and add diagnosis to the patient's orders. I can then print and fax.

## 2024-02-17 ENCOUNTER — TELEPHONE (OUTPATIENT)
Dept: URGENT CARE | Facility: CLINIC | Age: 79
End: 2024-02-17
Payer: MEDICARE

## 2024-02-20 ENCOUNTER — OFFICE VISIT (OUTPATIENT)
Dept: NEUROLOGY | Facility: CLINIC | Age: 79
End: 2024-02-20
Payer: MEDICARE

## 2024-02-20 ENCOUNTER — PATIENT MESSAGE (OUTPATIENT)
Dept: NEUROLOGY | Facility: CLINIC | Age: 79
End: 2024-02-20

## 2024-02-20 ENCOUNTER — TELEPHONE (OUTPATIENT)
Dept: INTERNAL MEDICINE | Facility: CLINIC | Age: 79
End: 2024-02-20
Payer: MEDICARE

## 2024-02-20 DIAGNOSIS — F02.80 ALZHEIMER DISEASE: ICD-10-CM

## 2024-02-20 DIAGNOSIS — G31.9 NEURODEGENERATIVE COGNITIVE IMPAIRMENT: ICD-10-CM

## 2024-02-20 DIAGNOSIS — Z81.8 FAMILY HISTORY OF SENILE DEMENTIA: ICD-10-CM

## 2024-02-20 DIAGNOSIS — E53.8 B12 DEFICIENCY: ICD-10-CM

## 2024-02-20 DIAGNOSIS — G20.C PARKINSONISM, UNSPECIFIED PARKINSONISM TYPE: ICD-10-CM

## 2024-02-20 DIAGNOSIS — G31.84 MCI (MILD COGNITIVE IMPAIRMENT): Primary | ICD-10-CM

## 2024-02-20 DIAGNOSIS — G30.9 ALZHEIMER DISEASE: ICD-10-CM

## 2024-02-20 PROCEDURE — 99443 PR PHYSICIAN TELEPHONE EVALUATION 21-30 MIN: CPT | Mod: 95,,, | Performed by: PSYCHIATRY & NEUROLOGY

## 2024-02-20 RX ORDER — DONEPEZIL HYDROCHLORIDE 10 MG/1
10 TABLET, FILM COATED ORAL EVERY MORNING
Qty: 90 TABLET | Refills: 3 | Status: SHIPPED | OUTPATIENT
Start: 2024-02-20 | End: 2025-02-19

## 2024-02-20 RX ORDER — MULTIVIT WITH MINERALS/HERBS
1 TABLET ORAL DAILY
Qty: 30 TABLET | Refills: 5 | Status: SHIPPED | OUTPATIENT
Start: 2024-02-20 | End: 2024-04-24 | Stop reason: ALTCHOICE

## 2024-02-20 NOTE — TELEPHONE ENCOUNTER
I called the patient's daughter. No answer, message left on her voicemail.  Revised orders and clinic notes faxed.

## 2024-02-20 NOTE — PROGRESS NOTES
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 (Hyperkinetic): tremor (postural)  Movement Disorder (Hypokinetic): parkinsonism (diminished facial expression, tone, resting tremor, bradykinesia), dyskinesia (slowing, hypometria, dysrhythmia)  Movement Disorder (Ocular): eyelid apraxia  Movement Disorder (Speech): abnormal vocal features (volume, rate)  Sensory Dysfunction: peripheral (A? fibers, A?/C fibers)  The neurocognitive battery is significant (based on age and education) for:  Executive predominant multi domain mild cognitive impairment  MMSE 27/30: MMSE Score suggestive of normal to questionable cognitive impairment.  MOCA 26/30: MOCA Score suggestive of mild cognitive impairment.  BEHAV5+ 0/6: See ROS section for a full description  The neurologically relevant imaging is significant for  MRI brain/head without contrast (2/9/2024): Mild-to-moderate generalized cortical atrophy posterior parietal predominance regional atrophy appreciated in the right greater than left hippocampal structures. Cavum septum vergae possibly suggestive of age  indeterminate head trauma. No clear frontotemporal atrophy. No strategic infarcts.  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     The patient's clinical presentation meets the criteria for Mild Cognitive Impairment (MCI-ADRC) (Reese MS, et al. 2011 Alzheimer's & Dementia).     Concern regarding an intraindividual change in cognition  Impairment in one or more cognitive domains  Preservation of independence in functional abilities.  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  A progressive cognitive decline that interferes with normal social or occupational function.  Prominent or persistent memory impairment may not necessarily occur in the early stages but is usually evident with progression.  Deficits on tests of attention, executive function, and visuospatial ability may be especially prominent.  Spontaneous motor features of parkinsonism.     At present, all neurodegenerative diseases can only be diagnosed with 100% certainty through a brain autopsy. The suspected neuropathology underlying the patient's neurocognitive impairment is likely a mixture of pathologies (Alzheimer's Disease Related Pathology, Lewy body disease/alpha-synucleinopathy, Vascular Contributions to Cognitive Impairment and Dementia).  Plasma Protein Biomarkers: [01/04/2024] Neurofilament Light Chain (Nfl): 31.5.  Cerebrospinal Fluid Protein Biomarkers: [01/04/2024] Phospho-Tau (181P): 1.36 (H).  There are no dermatological protein  biomarkers available on record.  There is no relevant genetic biomarkers available on record.     The patient presents with a two-year history of gradual, progressive, executive-predominant, multi-domain mild cognitive impairment. Examination raises concerns for early signs of Parkinsonism. Serum biomarkers suggest a mixed pathology consistent with Alzheimer's disease. It is likely that the patient is in the early stages of Lewy body dementia, although they do not meet the research criteria at this time. We have discussed symptomatic medications and recommend initiating treatment with donepezil. Additionally, we advise repeating the vitamin B12 test. The value of a lumbar puncture was discussed; however, the patient has expressed no interest in proceeding. We plan to schedule a skin biopsy to confirm the presence of alpha-synuclein pathology.     The observations made above, were discussed with the patient and their supporting historian(s) (family). We have discussed the additional diagnostic(s) and/or managenent below.     Care Management Plan:    #Diagnostic Screening for measurable forms of neurodegenerative pathology.  We have discussed opportunities for biomarker testing (CSF Fry biomarkers, IDEAs Amyloid-PET, Syn-One skin biopsy).  We scheduled a skin biopsy for assessment of Syn-One alpha-synuclein related pathology  #Optimize Neurocognitive Impairment and Quality  We have discussed the MIND Diet and other lifestyle behavior that may help maintain brain health.  We have provided written/digital reading material  Continue donepezil 10 mg; refills placed  Start B12 5000 mcg Q weekly  Th patient declines interest in anti amyloid therapy and declines interest in lumbar puncture  #Optimize Behavioral Management and Quality.  Continue Namenda 10 mg b.i.d.  #Optimize Sleep Hygiene and Quality  We discussed and recommended additional diagnostic/management of sleep disorder to optimize brain health and  longevity.  follow-up referral to sleep Medicine for symptoms suggestive of sleep apnea  #Optimize Movement Disorder and Quality.  We have referred to Neurology-specific physical therapy/occupational therapy.  #Optimize Cerebrovascular Health.  The patient has a documented history of hyperlipidemia and/or hypercholesteremia with long-term complications such as cerebrovascular disease, peripheral vascular disease, and/or aortic atherosclerosis. Collectively these risk factors may contribute to cerebral atherosclerosis, and cerebral hypoperfusion compounded neurocognitive disorder. We discussed maximizing cerebrovascular-related medical therapy, including but not limited to cholesterol medications and antiplatelet agents. We have discussed the value of aggressively controlling vascular risk factors like hypertension, hyperlipidemia, and Diabetes SBP<130, LDL<100, and A1C<7.0. We discussed the need to optimize lifestyle choices, including a heart-healthy diet (e.g., Mediterranean or DASH), increased cardiovascular exercise (goal 150 minutes of moderate-intensity per week), and staying cognitively and socially active.  continue atorvastatin 10 mg daily  Continue aspirin 81 mg daily  #Behavioral/Environmental Strategies  We recommend engaging in activities that stimulate cognitively and socially while avoiding excessive stimulation and fatigue in overwhelmingly complex situations.  We recommend integrating routine and schedule into your daily life. https://www.alzheimersproject.org/news/the-importance-of-routine-and-familiarity-to-persons-with-dementia/  #Health Maintenance/Lifestyle Advice  We have discussed the value in aggressively controlling vascular risk factors like hypertension, hyperlipidemia, and Diabetes SBP<130, LDL<100, A1C<7.0.  We discussed the need to optimize lifestyle choices including a heart-healthy diet (e.g., Mediterranean or DASH), increased cardiovascular exercise (goal 150 minutes of  "moderate-intensity per week), and stay cognitively and socially active.  We recommend the MIND diet, a combination of two healthy diets: the Mediterranean diet and the DASH (Dietary Approaches to Stop Hypertension) diet, and includes a variety of brain-friendly foods to optimize cognitive health and longevity.  #Support  We all need support sometimes. Get easy access to local resources, community programs, and services. https://www.communityresourcefinder.org/  Learn more about Cognitive Impairment in [place]: https://www.alz.org/professionals/public-health/state-overview/[place]  #Safety  The Alzheimer's Association administers the nationwide "Safe Return" program with identification bracelets, necklaces, or clothing tags and 24-hour assistance. More information is available online at https://www.alz.org/help-support/caregiving/safety/medicalert-with-24-7-wandering-support  #Follow up:  Follow-up as needed.    Thank you for allowing us to participate in the care of your patient. Please do not hesitate to contact us with any questions or concerns.     It was a pleasure seeing The patient and we look forward to seeing them at their follow-up visit.     This note is dictated on M*Modal Fluency Direct word recognition program. There are word recognition mistakes that are occasionally missed on review. This service was not originating from a related E/M service provided within the previous 7 days nor will  to an E/M service or procedure within the next 24 hours or my soonest available appointment. Prevailing standard of care was able to be met in this audio-only visit.     "

## 2024-02-24 PROCEDURE — 95810 POLYSOM 6/> YRS 4/> PARAM: CPT | Mod: 26,,, | Performed by: INTERNAL MEDICINE

## 2024-02-27 ENCOUNTER — PATIENT MESSAGE (OUTPATIENT)
Dept: SLEEP MEDICINE | Facility: CLINIC | Age: 79
End: 2024-02-27
Payer: MEDICARE

## 2024-03-11 ENCOUNTER — TELEPHONE (OUTPATIENT)
Dept: NEUROLOGY | Facility: CLINIC | Age: 79
End: 2024-03-11
Payer: MEDICARE

## 2024-03-11 NOTE — TELEPHONE ENCOUNTER
----- Message from Eze Rubio MA sent at 3/11/2024  4:54 PM CDT -----  Regarding: FW: Appt Access  Contact: [phone]    ----- Message -----  From: Judith Patiño  Sent: 3/11/2024   1:57 PM CDT  To: Lucie LOMELI Staff  Subject: Appt Access                                      Pt's friend is calling to speak w RACHAEL Rubio in regards to getting the pt scheduled for a skin biopsy. Please give her a call back.

## 2024-03-12 ENCOUNTER — TELEPHONE (OUTPATIENT)
Dept: NEUROLOGY | Facility: CLINIC | Age: 79
End: 2024-03-12
Payer: MEDICARE

## 2024-03-13 NOTE — TELEPHONE ENCOUNTER
----- Message from Rose Santos sent at 3/12/2024 12:38 PM CDT -----  Regarding: pt advice  Contact: [phone]  Pt daughter returning callback from missed call. Requesting to speak with Eze in   office. Please call.

## 2024-03-13 NOTE — TELEPHONE ENCOUNTER
Spoke with daughter/CG and help assist with scheduling skin biopsy.    Skin biopsy scheduled on 4/19/24 at 2:30pm.

## 2024-03-20 ENCOUNTER — TELEPHONE (OUTPATIENT)
Dept: NEUROLOGY | Facility: CLINIC | Age: 79
End: 2024-03-20
Payer: MEDICARE

## 2024-03-20 NOTE — TELEPHONE ENCOUNTER
----- Message from Ryan Najera sent at 3/20/2024  1:22 PM CDT -----  Regarding: Pt Advice  Contact: Rajni/daughter [phone]  Rajni/daughter calling regarding pt falling more since stop taking medication memantine (NAMENDA) 10 MG Tab (Discontinued) . Would like to discuss if this could be the cause. Please call [phone]

## 2024-03-20 NOTE — TELEPHONE ENCOUNTER
Spoke with patient's daughter and the following was discussed.    Patient is prescribed with memantine. However, daughter states that patient has been falling very frequently (1 fall every week since March)    CG wants to know if medication is the cause of the fall.

## 2024-03-25 ENCOUNTER — TELEPHONE (OUTPATIENT)
Dept: NEUROLOGY | Facility: CLINIC | Age: 79
End: 2024-03-25
Payer: MEDICARE

## 2024-03-25 NOTE — TELEPHONE ENCOUNTER
SW called patient's half-way to [facility] about patient's condition. They requested SW to call back tomorrow as Amarilys, who would discuss patient's case, had left for the day.

## 2024-03-26 NOTE — TELEPHONE ENCOUNTER
"SW called [facility] to inquire about how patient has been doing.  provided some information regarding the patient's condition. The patient is independently living at the facility. Patient takes her medication on her own, and it appears the daughter helps manage it and prepare it.      has had "quite a few falls." Patient fell Friday night and stayed on the floor all night because she couldn't get up. She also couldn't get to her phone to notify anyone. [facility] has provided her a pendant with a button that she can press to alert staff she is in distress or she has fallen. It is important to note that patient must choose to wear the pendant, as she is not instructed daily to do so.  says that they are hopeful the patient will benefit from the pendant. She states that they are unsure why patient is having these falls. SW inquired about any patterns in patient's falls;  states that she usually falls in mid-morning, the Friday night fall was absolutely out of the ordinary.    SW will relay information to patient's neuro team.  "

## 2024-03-27 ENCOUNTER — TELEPHONE (OUTPATIENT)
Dept: NEUROLOGY | Facility: CLINIC | Age: 79
End: 2024-03-27
Payer: MEDICARE

## 2024-04-15 ENCOUNTER — TELEPHONE (OUTPATIENT)
Dept: NEUROLOGY | Facility: CLINIC | Age: 79
End: 2024-04-15
Payer: MEDICARE

## 2024-04-15 NOTE — TELEPHONE ENCOUNTER
Called [facility] to check on status of Syn-One PA for biopsy scheduled this Friday. Spoke with Kedar. Per Kedar, PA was not received. Completed PA and compiled supporting documentation; encrypted email sent to [email or web address] per Kedar for expedited service.

## 2024-04-18 ENCOUNTER — TELEPHONE (OUTPATIENT)
Dept: NEUROLOGY | Facility: CLINIC | Age: 79
End: 2024-04-18
Payer: MEDICARE

## 2024-04-18 NOTE — TELEPHONE ENCOUNTER
----- Message from Bernadette Styles sent at 4/18/2024 11:10 AM CDT -----  Regarding: Pt's Daughter Rajni Toledo called to cancel the pt's 4/19/24 procedure appt and states that she will call back when the pt wants to r/s  Patient Advice:    Pt's Daughter Rajni Toledo called to cancel the pt's 4/19/24 procedure appt and states that she will call back when the pt wants to r/s    Ms Urban can be reached at [phone]

## 2024-04-24 ENCOUNTER — OFFICE VISIT (OUTPATIENT)
Dept: PRIMARY CARE CLINIC | Facility: CLINIC | Age: 79
End: 2024-04-24
Payer: MEDICARE

## 2024-04-24 VITALS
HEART RATE: 101 BPM | BODY MASS INDEX: 33.94 KG/M2 | DIASTOLIC BLOOD PRESSURE: 60 MMHG | SYSTOLIC BLOOD PRESSURE: 104 MMHG | WEIGHT: 197.75 LBS | OXYGEN SATURATION: 98 %

## 2024-04-24 DIAGNOSIS — R54 AGE-RELATED PHYSICAL DEBILITY: ICD-10-CM

## 2024-04-24 DIAGNOSIS — R53.1 WEAKNESS: ICD-10-CM

## 2024-04-24 DIAGNOSIS — Z99.89 DEPENDENT ON WALKER FOR AMBULATION: ICD-10-CM

## 2024-04-24 DIAGNOSIS — G20.C PARKINSONISM, UNSPECIFIED PARKINSONISM TYPE: ICD-10-CM

## 2024-04-24 DIAGNOSIS — G31.84 MCI (MILD COGNITIVE IMPAIRMENT): ICD-10-CM

## 2024-04-24 DIAGNOSIS — E78.5 HYPERLIPIDEMIA, UNSPECIFIED HYPERLIPIDEMIA TYPE: ICD-10-CM

## 2024-04-24 DIAGNOSIS — Z76.89 ENCOUNTER TO ESTABLISH CARE WITH NEW DOCTOR: Primary | ICD-10-CM

## 2024-04-24 DIAGNOSIS — R29.6 FREQUENT FALLS: ICD-10-CM

## 2024-04-24 DIAGNOSIS — D69.2 SENILE PURPURA: ICD-10-CM

## 2024-04-24 DIAGNOSIS — R29.898 WEAKNESS OF LOWER EXTREMITY, UNSPECIFIED LATERALITY: ICD-10-CM

## 2024-04-24 LAB
BILIRUB SERPL-MCNC: NEGATIVE MG/DL
BLOOD URINE, POC: NORMAL
CLARITY, POC UA: CLEAR
COLOR, POC UA: YELLOW
GLUCOSE UR QL STRIP: NEGATIVE
KETONES UR QL STRIP: NEGATIVE
LEUKOCYTE ESTERASE URINE, POC: NEGATIVE
NITRITE, POC UA: NEGATIVE
PH, POC UA: 6
PROTEIN, POC: NORMAL
SPECIFIC GRAVITY, POC UA: 1.03
UROBILINOGEN, POC UA: NORMAL

## 2024-04-24 PROCEDURE — 99213 OFFICE O/P EST LOW 20 MIN: CPT | Mod: PBBFAC,PN,25 | Performed by: STUDENT IN AN ORGANIZED HEALTH CARE EDUCATION/TRAINING PROGRAM

## 2024-04-24 PROCEDURE — 81002 URINALYSIS NONAUTO W/O SCOPE: CPT | Mod: PBBFAC,PN | Performed by: STUDENT IN AN ORGANIZED HEALTH CARE EDUCATION/TRAINING PROGRAM

## 2024-04-24 PROCEDURE — 99214 OFFICE O/P EST MOD 30 MIN: CPT | Mod: S$PBB,,, | Performed by: STUDENT IN AN ORGANIZED HEALTH CARE EDUCATION/TRAINING PROGRAM

## 2024-04-24 PROCEDURE — G0009 ADMIN PNEUMOCOCCAL VACCINE: HCPCS | Mod: PBBFAC,PN

## 2024-04-24 PROCEDURE — 99999PBSHW POCT URINE DIPSTICK WITHOUT MICROSCOPE: Mod: PBBFAC,,,

## 2024-04-24 PROCEDURE — 90677 PCV20 VACCINE IM: CPT | Mod: PBBFAC,PN

## 2024-04-24 PROCEDURE — 99999 PR PBB SHADOW E&M-EST. PATIENT-LVL III: CPT | Mod: PBBFAC,,, | Performed by: STUDENT IN AN ORGANIZED HEALTH CARE EDUCATION/TRAINING PROGRAM

## 2024-04-24 PROCEDURE — 99999PBSHW PR PBB SHADOW TECHNICAL ONLY FILED TO HB: Mod: PBBFAC,,,

## 2024-04-24 RX ORDER — ATORVASTATIN CALCIUM 10 MG/1
10 TABLET, FILM COATED ORAL DAILY
Qty: 90 TABLET | Refills: 3 | Status: SHIPPED | OUTPATIENT
Start: 2024-04-24

## 2024-04-24 RX ADMIN — PNEUMOCOCCAL 20-VALENT CONJUGATE VACCINE 0.5 ML
2.2; 2.2; 2.2; 2.2; 2.2; 2.2; 2.2; 2.2; 2.2; 2.2; 2.2; 2.2; 2.2; 2.2; 2.2; 2.2; 4.4; 2.2; 2.2; 2.2 INJECTION, SUSPENSION INTRAMUSCULAR at 03:04

## 2024-04-24 NOTE — ASSESSMENT & PLAN NOTE
Will try to increase strength by adding PT/OT. She also does exercises at the assisted living. Continue. Will order lightweight wheelchair.     Ms. cook has a mobility limitation that significantly impairs her ability to participate in one or more mobility related activities of daily living (MRADL's) such as toileting, feeding, dressing, grooming, and bathing in customary locations in the home. The mobility limitation cannot be sufficiently resolved by the use of a cane or walker. The patient can self propel in a lightweight wheelchair, cannot self propel in a standard wheelchair. The use of a lightweight wheelchair will significantly improve the patient's ability to participate in MRADLS and the patient will use it on regular basis in the home. Ms cook has expressed her willingness to use a manual wheelchair in the home. Patient's upper body strength is sufficient for propulsion. She also has a caregiver who is available, willing, and able to provide assistance with the wheelchair when needed.

## 2024-04-24 NOTE — ASSESSMENT & PLAN NOTE
Recommended that patient keeps her brain active by doing puzzles and other activities daily. Recommended she take turmeric capsules. Continue donepezil.

## 2024-04-24 NOTE — PROGRESS NOTES
"INTERNAL MEDICINE INITIAL VISIT NOTE      CHIEF COMPLAINT     Chief Complaint   Patient presents with    [facility]       HPI     Viji Santana is a 78 y.o. AA female who presents with urinary incontinence, HLD, dementia.  -having frequent falls. Saw neurology who also wants her to try PT. Ordered  PT/OT  -seeing neurology for evaluation (dr. Faulkner). Has dementia. From his note " Examination raises concerns for early signs of Parkinsonism. Serum biomarkers suggest a mixed pathology consistent with Alzheimer's disease. It is likely that the patient is in the early stages of Lewy body dementia, although they do not meet the research criteria at this time." She does have tremor in her right leg. Could be from weakness. Will test her for UTI.  -do not need labs today  -prevnar 20 today.    Advance Care Planning     Date: 04/24/2024    Power of   I initiated the process of voluntary advance care planning today and explained the importance of this process to the patient.  I introduced the concept of advance directives to the patient, as well. Then the patient received detailed information about the importance of designating a Health Care Power of  (HCPOA). She was also instructed to communicate with this person about their wishes for future healthcare, should she become sick and lose decision-making capacity. The patient has previously appointed a HCPOA. After our discussion, the patient has decided to complete a HCPOA and has appointed her daughter, health care agent: Rajni Nelson & health care agent number: [phone]. I spent a total time of 5 minutes discussing this issue with the patient.    -has HCPOA form. Will put it in the chart.          Past Medical History:  Past Medical History:   Diagnosis Date    Hyperlipidemia     Memory impairment        Past Surgical History:  Past Surgical History:   Procedure Laterality Date    EYE SURGERY  2012    Procedure to relieve eye pressure "       Allergies:  Review of patient's allergies indicates:  No Known Allergies    Home Medications:  Prior to Admission medications    Medication Sig Start Date End Date Taking? Authorizing Provider   aspirin 81 mg Cap Take by mouth.   Yes Provider, Historical   atorvastatin (LIPITOR) 10 MG tablet Take 10 mg by mouth. 23  Yes Provider, Historical   b complex vitamins (B COMPLEX-VITAMIN B12) tablet Take 1 tablet by mouth once daily. 24  Yes Ceasar Faulkner MD   calcium carbonate (CALCIUM 300 ORAL) Take by mouth.   Yes Provider, Historical   co-enzyme Q-10 30 mg capsule Take 30 mg by mouth 3 (three) times daily.   Yes Provider, Historical   donepeziL (ARICEPT) 10 MG tablet Take 1 tablet (10 mg total) by mouth every morning. 24 Yes Ceasar Faulkner MD   multivitamin (THERAGRAN) per tablet Take 1 tablet by mouth once daily.   Yes Provider, Historical   cyanocobalamin, vitamin B-12, 5,000 mcg Subl Place one under tongue once a day for 1 month, then continue to take under tongue per week  Patient not taking: Reported on 2024   Ceasar Faulkner MD       Family History:  Family History   Problem Relation Name Age of Onset    Hypertension Mother      Heart disease Mother      Hypertension Brother      Hypertension Brother      Alzheimer's disease Maternal Aunt         Social History:  Social History     Tobacco Use    Smoking status: Former     Current packs/day: 0.00     Average packs/day: 0.3 packs/day for 15.0 years (3.8 ttl pk-yrs)     Types: Cigarettes     Quit date: 1983     Years since quittin.4    Smokeless tobacco: Never    Tobacco comments:     Quit    Substance Use Topics    Alcohol use: Yes     Alcohol/week: 2.0 standard drinks of alcohol     Types: 2 Glasses of wine per week    Drug use: Never       Review of Systems:  Review of Systems   Constitutional:  Negative for chills and fever.   HENT:  Negative for congestion, rhinorrhea and sore throat.    Respiratory:   Negative for chest tightness.    Cardiovascular:  Negative for chest pain.   Gastrointestinal:  Negative for abdominal pain, blood in stool, constipation and diarrhea.   Genitourinary:  Negative for dysuria and hematuria.   Neurological:  Negative for dizziness, light-headedness and headaches.           PHYSICAL EXAM     /60 (BP Location: Left arm, Patient Position: Sitting, BP Method: Medium (Manual))   Pulse 101   Wt 89.7 kg (197 lb 12 oz)   SpO2 98%   BMI 33.94 kg/m²     GEN - A+OX4, NAD   HEENT - PERRL  Neck - No cervical LAD.   CV - RRR, no m/r   Chest - CTAB, no wheezing or rhonchi  Abd - S/NT/ND/+BS.   Ext - 2+ radial pulses. 1+ BL lower ext edema present   MSK - slow gait, uses a rollator  Skin - no rash    LABS     Previous labs reviewed. Hb, renal function, lfts, t4, b1 wnl.     ASSESSMENT/PLAN   1. Encounter to establish care with new doctor  Assessment & Plan:  Reviewed PMHx, labs, medications, vaccinations and screenings. Ordered prevnar 20      2. Weakness  -     POCT URINE DIPSTICK WITHOUT MICROSCOPE    3. Weakness of lower extremity, unspecified laterality  Assessment & Plan:  Will try to increase strength by adding PT/OT. She also does exercises at the assisted living. Continue. Will order lightweight wheelchair.     Ms. cook has a mobility limitation that significantly impairs her ability to participate in one or more mobility related activities of daily living (MRADL's) such as toileting, feeding, dressing, grooming, and bathing in customary locations in the home. The mobility limitation cannot be sufficiently resolved by the use of a cane or walker. The patient can self propel in a lightweight wheelchair, cannot self propel in a standard wheelchair. The use of a lightweight wheelchair will significantly improve the patient's ability to participate in MRADLS and the patient will use it on regular basis in the home. Ms cook has expressed her willingness to use a manual wheelchair in  "the home. Patient's upper body strength is sufficient for propulsion. She also has a caregiver who is available, willing, and able to provide assistance with the wheelchair when needed.     Orders:  -     Ambulatory referral/consult to Home Health; Future; Expected date: 04/25/2024    4. Age-related physical debility  -     Ambulatory referral/consult to Home Health; Future; Expected date: 04/25/2024    5. MCI (mild cognitive impairment)  Overview:  seeing neurology for evaluation (dr. Faulkner). Has dementia. From his note " Examination raises concerns for early signs of Parkinsonism. Serum biomarkers suggest a mixed pathology consistent with Alzheimer's disease. It is likely that the patient is in the early stages of Lewy body dementia, although they do not meet the research criteria at this time.    Assessment & Plan:  Recommended that patient keeps her brain active by doing puzzles and other activities daily. Recommended she take turmeric capsules. Continue donepezil.       6. Hyperlipidemia, unspecified hyperlipidemia type  Assessment & Plan:  On atorvastatin. Continue. Refilled today.      7. Frequent falls  Assessment & Plan:  Would like her to increase strength in her lower extremities. Will order Home health PT/OT      8. Dependent on walker for ambulation  Assessment & Plan:  Ambulates with assistance of a rollator. continue      9. Senile purpura  Assessment & Plan:  Per patient, bruises easily. On asa. No signs of major bleeding. Will monitor       10. Parkinsonism, unspecified Parkinsonism type  Assessment & Plan:  Ms. cook has a mobility limitation that significantly impairs her ability to participate in one or more mobility related activities of daily living (MRADL's) such as toileting, feeding, dressing, grooming, and bathing in customary locations in the home. The mobility limitation cannot be sufficiently resolved by the use of a cane or walker. The patient can self propel in a lightweight " wheelchair, cannot self propel in a standard wheelchair. The use of a lightweight wheelchair will significantly improve the patient's ability to participate in MRADLS and the patient will use it on regular basis in the home. Ms cook has expressed her willingness to use a manual wheelchair in the home. Patient's upper body strength is sufficient for propulsion. She also has a caregiver who is available, willing, and able to provide assistance with the wheelchair when needed.       Other orders  -     atorvastatin (LIPITOR) 10 MG tablet; Take 1 tablet (10 mg total) by mouth once daily.  Dispense: 90 tablet; Refill: 3  -     pneumoc 20-saulo conj-dip cr(PF) (PREVNAR-20 (PF)) injection Syrg 0.5 mL         RTC in 2 months, sooner if needed     Joe Molina MD  Department of Internal Medicine - [facility]  1:18 PM     I spent a total of 30 minutes on the day of the visit.This includes face to face time and non-face to face time preparing to see the patient (eg, review of tests), obtaining and/or reviewing separately obtained history, documenting clinical information in the electronic or other health record, independently interpreting results and communicating results to the patient/family/caregiver, or care coordinator.

## 2024-04-28 PROCEDURE — G0180 MD CERTIFICATION HHA PATIENT: HCPCS | Mod: ,,, | Performed by: STUDENT IN AN ORGANIZED HEALTH CARE EDUCATION/TRAINING PROGRAM

## 2024-05-03 ENCOUNTER — TELEPHONE (OUTPATIENT)
Dept: NEUROLOGY | Facility: CLINIC | Age: 79
End: 2024-05-03
Payer: MEDICARE

## 2024-05-03 NOTE — TELEPHONE ENCOUNTER
----- Message from Marielos Castellanos MA sent at 5/3/2024  2:48 PM CDT -----  Regarding: FW: Appt  Contact: [phone]    ----- Message -----  From: Ryan Najera  Sent: 5/3/2024   1:44 PM CDT  To: Lucie LOMELI Staff  Subject: Appt                                             Rajni/daughter calling to schedule appt. Pt is  unsteady  when walking. Attempted to schedule. Please call [phone]

## 2024-05-03 NOTE — TELEPHONE ENCOUNTER
Spoke with patient's daughter in regard to scheduling an appointment. Patient was originally scheduled for skin biopsy back in April but no longer wants to move forward with it.    Daughter disclosed recent updates about patient:  Patient now has a walker. Patient has been falling recently and has difficulty getting up from sitting position. Patient is currently in PT for movement disorders such as tremors.    Daughter wants to know if patient can be seen for an appointment to further discuss.

## 2024-05-03 NOTE — TELEPHONE ENCOUNTER
----- Message from Sally Romo sent at 5/3/2024 11:12 AM CDT -----  Type:  Appointment Request    Name of Caller:pt  daughter   When is the first available appointment?No access  Symptoms:f.u memory disorder  Would the patient rather a call back or a response via [facility]? Call back   Best Call Back Number:[phone]  Additional Information: pt daughter indicates she is trying to schedule an in person appt for pt. Pt daughter indicates she would like to schedule next available. Please call back with further assistance and more information.

## 2024-05-03 NOTE — TELEPHONE ENCOUNTER
Attempted to return call to patient's daughter, LVM to return the call.    Patient was scheduled on 4/19 for skin biopsy. However, skin biopsy procedure was canceled. Psychometrist was previously informed by patient's daughter that they will reschedule to a later date when able to.

## 2024-05-06 ENCOUNTER — PATIENT MESSAGE (OUTPATIENT)
Dept: NEUROLOGY | Facility: CLINIC | Age: 79
End: 2024-05-06
Payer: MEDICARE

## 2024-05-06 ENCOUNTER — TELEPHONE (OUTPATIENT)
Dept: NEUROLOGY | Facility: CLINIC | Age: 79
End: 2024-05-06
Payer: MEDICARE

## 2024-05-20 ENCOUNTER — EXTERNAL HOME HEALTH (OUTPATIENT)
Dept: HOME HEALTH SERVICES | Facility: HOSPITAL | Age: 79
End: 2024-05-20
Payer: MEDICARE

## 2024-05-22 NOTE — TELEPHONE ENCOUNTER
----- Message from Maricarmen Gamble sent at 3/27/2024 11:45 AM CDT -----  Regarding: pt advice  Contact: [phone]  Pt daughter Rajni is calling to speak with someone in provider office  in regards to the pt falling and her medication Rajni stated the nurse has been calling the nursing facility and she would like to speak with the nurse concerning her mother Rajni is asking for a return call please call her at  [phone]    
full rom of neck mal 1/none

## 2024-05-30 ENCOUNTER — DOCUMENT SCAN (OUTPATIENT)
Dept: HOME HEALTH SERVICES | Facility: HOSPITAL | Age: 79
End: 2024-05-30
Payer: MEDICARE

## 2024-05-30 ENCOUNTER — TELEPHONE (OUTPATIENT)
Dept: PRIMARY CARE CLINIC | Facility: CLINIC | Age: 79
End: 2024-05-30
Payer: MEDICARE

## 2024-05-30 DIAGNOSIS — G20.C PARKINSONISM, UNSPECIFIED PARKINSONISM TYPE: Primary | ICD-10-CM

## 2024-05-30 NOTE — TELEPHONE ENCOUNTER
----- Message from Leila Ellis MA sent at 2024  9:36 AM CDT -----  Contact: Maame @ [facility] [phone]  Hi Dr. Molina,     The order is . Please advise.     Thanks,     Leila Ellis MA Float [facility]  ----- Message -----  From: Roxy Carrillo  Sent: 2024   3:38 PM CDT  To: Tracy Diaz Staff    Would like to receive medical advice.   Requesting a referral for therapy    Would they like a call back or a response via [facility]:  call back    Additional information:  Maame from [facility]  is calling to request an order for the pt's to start physical therapy with them in the office. Maame states the pt's was discharged recently from [facility] and was told she will need therapy. Please call Maame back for advice    Fax    [phone]    Att To Maame

## 2024-06-03 ENCOUNTER — TELEPHONE (OUTPATIENT)
Dept: PRIMARY CARE CLINIC | Facility: CLINIC | Age: 79
End: 2024-06-03
Payer: MEDICARE

## 2024-06-03 DIAGNOSIS — G20.C PARKINSONISM, UNSPECIFIED PARKINSONISM TYPE: Primary | ICD-10-CM

## 2024-06-03 NOTE — TELEPHONE ENCOUNTER
----- Message from Jenny Randle MA sent at 6/3/2024  9:37 AM CDT -----  Regarding: #PLEASE ADVISE.  Contact: Maame @ [facility] [phone]   Maame from [facility]  is calling to request an order for the pt's to start physical therapy with them in the office. Maame states the pt's was discharged recently from [facility] and was told she will need therapy. Please call Maame back for advice  ----- Message -----  From: Carole Cooper  Sent: 5/28/2024   3:40 PM CDT  To: Jenny Randle MA      ----- Message -----  From: Roxy Carrillo  Sent: 5/28/2024   3:38 PM CDT  To: Tracy Diaz Staff    Would like to receive medical advice.   Requesting a referral for therapy    Would they like a call back or a response via [facility]:  call back    Additional information:  Maame from [facility]  is calling to request an order for the pt's to start physical therapy with them in the office. Maame states the pt's was discharged recently from [facility] and was told she will need therapy. Please call Maame back for advice    Fax    [phone]    Att To Maame

## 2024-06-05 NOTE — PROGRESS NOTES
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 due to Lewy body disease, consistent with early signs outlined by Maicol et al. in 2005 and 2020. Her frequent falls and instability indicate motor dysfunction, a hallmark of Lewy body disease. The use of a rollator due to deconditioning and difficulty walking short distances reinforces this. A reported "rapid decline" in cognition and ambulation highlights cognitive fluctuations, a core feature of Lewy body dementia. Parkinsonian signs, such as bradykinesia and rigidity, were noted, with prior recommendations for a skin biopsy and lumbar puncture to rule out alpha-synuclein disease. Additionally, she experiences sleep disturbances, specifically sleep apnea, and has neuropsychiatric symptoms, including forgetfulness in medication management. Her mildly elevated P-tau level (1.36) further suggests an underlying neurocognitive disorder.     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 The patient has declined " invasive diagnostic testing, but we plan to schedule a skin biopsy to confirm alpha-synuclein pathology.     The observations made above, were discussed with the patient and their supporting historian(s) (family). We have discussed the additional diagnostic(s) and/or managenent below.     Care Management Plan:    #Diagnostic Screening for measurable forms of neurodegenerative pathology.  We have discussed opportunities for biomarker testing (CSF Fry biomarkers, IDEAs Amyloid-PET, Syn-One skin biopsy). We have scheduled an appointment at 1330 to obtain skin biopsy to test for the presence of alpha synuclein. PA has been obtained.   We scheduled a skin biopsy for assessment of Syn-One alpha-synuclein related pathology  #Optimize Neurocognitive Impairment and Quality  continue the MIND Diet and other lifestyle behavior that may help maintain brain health.  Continue donepezil 10 mg po Am.   continue B12 5000 mcg Q weekly  The patient declines interest in anti amyloid therapy and declines interest in lumbar puncture  #Optimize Behavioral Management and Quality.  Discontinued Namenda 10 mg b.i.d. per Dr. Faulkner.   #Optimize Sleep Hygiene and Quality  We discussed and recommended additional diagnostic/management of sleep disorder to optimize brain health and longevity.  follow-up referral to sleep Medicine for symptoms suggestive of sleep apnea. Patient declined CPAP. We discussed the benefits of using CPAP to help obtain deep restful sleep and ease reversible symptoms of cognitive decline.   #Optimize Movement Disorder and Quality.  We have referred to Neurology-specific physical therapy/occupational therapy.  #Optimize Cerebrovascular Health.  The patient has a documented history of hyperlipidemia and/or hypercholesteremia with long-term complications such as cerebrovascular disease, peripheral vascular disease, and/or aortic atherosclerosis. Collectively these risk factors may contribute to cerebral atherosclerosis, and  cerebral hypoperfusion compounded neurocognitive disorder. We discussed maximizing cerebrovascular-related medical therapy, including but not limited to cholesterol medications and antiplatelet agents. We have discussed the value of aggressively controlling vascular risk factors like hypertension, hyperlipidemia, and Diabetes SBP<130, LDL<100, and A1C<7.0. We discussed the need to optimize lifestyle choices, including a heart-healthy diet (e.g., Mediterranean or DASH), increased cardiovascular exercise (goal 150 minutes of moderate-intensity per week), and staying cognitively and socially active.  continue atorvastatin 10 mg daily  Continue aspirin 81 mg daily  #Behavioral/Environmental Strategies  We recommend engaging in activities that stimulate cognitively and socially while avoiding excessive stimulation and fatigue in overwhelmingly complex situations.  We recommend integrating routine and schedule into your daily life. https://www.alzheimersproject.org/news/the-importance-of-routine-and-familiarity-to-persons-with-dementia/  #Health Maintenance/Lifestyle Advice  We have discussed the value in aggressively controlling vascular risk factors like hypertension, hyperlipidemia, and Diabetes SBP<130, LDL<100, A1C<7.0.  We discussed the need to optimize lifestyle choices including a heart-healthy diet (e.g., Mediterranean or DASH), increased cardiovascular exercise (goal 150 minutes of moderate-intensity per week), and stay cognitively and socially active.  Continue the  MIND diet, a combination of two healthy diets: the Mediterranean diet and the DASH (Dietary Approaches to Stop Hypertension) diet, and includes a variety of brain-friendly foods to optimize cognitive health and longevity.  #Support  We all need support sometimes. Get easy access to local resources, community programs, and services. https://www.communityresourcefinder.org/  Learn more about Cognitive Impairment in [place]:  "https://www.alz.org/professionals/public-health/state-overview/[place]  #Safety  The Alzheimer[employer] administers the nationwide "Safe Return" program with identification bracelets, necklaces, or clothing tags and 24-hour assistance. More information is available online at https://www.alz.org/help-support/caregiving/safety/medicalert-with-24-7-wandering-support  #Follow up:  Follow-up with Dr. Faulkner in 4 weeks to discuss CND results .     Thank you for allowing us to participate in the care of your patient. Please do not hesitate to contact us with any questions or concerns.     It was a pleasure seeing The patient and we look forward to seeing them at their follow-up visit.     I spent a total of 50 minutes (from 10:25 AM to 11:15 AM) on 2024-06-03 14:12:34, engaging in person in a face-to-face consultation with . More than 50% of this time was devoted to counseling on symptoms, treatment plans, risks, therapeutic options, lifestyle modifications, and safety concerns related to the above diagnoses.  A Review of Systems was completed, encompassing 10 of the 14 systems. All findings were negative, with the exception of those noted in the History of Present Illness (HPI). The systems reviewed were Constitutional (Const), Eyes, Ear/Nose/Throat (ENT), Respiratory (Resp), Cardiovascular (CV), Gastrointestinal (GI), Genitourinary (), Musculoskeletal (MSK), Skin, and Neurological (Neuro).  I spent a total of 10 minutes reviewing and summarizing records from outside physicians on the day of the clinical evaluation. This review and summary were conducted as described in the History of Present Illness (HPI).  I performed a neurobehavioral status examination on the day of clinical evaluation that included a clinical assessment of thinking, reasoning, and judgment to ensure a comprehensive approach in managing the complex and evolving needs of the patient's neurocognitive condition. Please see above HPI and ROS for " full details. This exam was performed on the day of clinical evaluation and included 37 minutes spent on direct face-to-face clinical observation and interview with the patient and 12 minutes spent interpreting test results and preparing the report. The total time of 37 minutes spent on the neurobehavioral status examination is not included in the time spent on evaluation and management coding.  Total Billing time spent on encounter/documentation for this patient's evaluation and management, not including the neurobehavioral status examination: 48 minutes.

## 2024-06-06 ENCOUNTER — DOCUMENTATION ONLY (OUTPATIENT)
Dept: REHABILITATION | Facility: HOSPITAL | Age: 79
End: 2024-06-06

## 2024-06-06 ENCOUNTER — SOCIAL WORK (OUTPATIENT)
Dept: NEUROLOGY | Facility: CLINIC | Age: 79
End: 2024-06-06
Payer: MEDICARE

## 2024-06-06 ENCOUNTER — OFFICE VISIT (OUTPATIENT)
Dept: NEUROLOGY | Facility: CLINIC | Age: 79
End: 2024-06-06
Payer: MEDICARE

## 2024-06-06 ENCOUNTER — TELEPHONE (OUTPATIENT)
Dept: PRIMARY CARE CLINIC | Facility: CLINIC | Age: 79
End: 2024-06-06
Payer: MEDICARE

## 2024-06-06 VITALS
DIASTOLIC BLOOD PRESSURE: 65 MMHG | SYSTOLIC BLOOD PRESSURE: 114 MMHG | HEART RATE: 97 BPM | WEIGHT: 194.31 LBS | HEIGHT: 64 IN | BODY MASS INDEX: 33.17 KG/M2

## 2024-06-06 DIAGNOSIS — G31.84 MCI (MILD COGNITIVE IMPAIRMENT): Primary | ICD-10-CM

## 2024-06-06 DIAGNOSIS — G20.C PARKINSONISM, UNSPECIFIED PARKINSONISM TYPE: ICD-10-CM

## 2024-06-06 DIAGNOSIS — G30.9 ALZHEIMER DISEASE: ICD-10-CM

## 2024-06-06 DIAGNOSIS — F02.80 ALZHEIMER DISEASE: ICD-10-CM

## 2024-06-06 DIAGNOSIS — R29.898 WEAKNESS OF LOWER EXTREMITY, UNSPECIFIED LATERALITY: Primary | ICD-10-CM

## 2024-06-06 DIAGNOSIS — G31.9 NEURODEGENERATIVE COGNITIVE IMPAIRMENT: ICD-10-CM

## 2024-06-06 PROCEDURE — 99215 OFFICE O/P EST HI 40 MIN: CPT | Mod: S$PBB,,, | Performed by: NURSE PRACTITIONER

## 2024-06-06 PROCEDURE — 96116 NUBHVL XM PHYS/QHP 1ST HR: CPT | Mod: PBBFAC | Performed by: PSYCHIATRY & NEUROLOGY

## 2024-06-06 PROCEDURE — 99999 PR PBB SHADOW E&M-EST. PATIENT-LVL III: CPT | Mod: PBBFAC,,, | Performed by: NURSE PRACTITIONER

## 2024-06-06 PROCEDURE — 99213 OFFICE O/P EST LOW 20 MIN: CPT | Mod: PBBFAC | Performed by: NURSE PRACTITIONER

## 2024-06-06 NOTE — PROGRESS NOTES
Patient and her daughter presented to outpatient PT ed but report patient is already getting home health therapy. They elect to continue home health services at this time and defer outpatient evaluation for now. Evaluation to be cancelled today but patient/daughter told they are more than welcome to return to outpatient rehab once home health is discharged.    Nidia Travis, PT, DPT

## 2024-06-06 NOTE — TELEPHONE ENCOUNTER
----- Message from Kayley Santana sent at 6/6/2024  4:43 PM CDT -----  Contact: [phone] [place]  1MEDICALADVICE     Patient is calling for Medical Advice regarding:please call pt daughter back about wheelchair and others questions and issue she need to have answered to.    How long has patient had these symptoms:    Pharmacy name and phone#:    Would like response via [employer]:  #call back    Comments:

## 2024-06-06 NOTE — TELEPHONE ENCOUNTER
CALLED PT DAUGHTER. PT DAUGHTER IS REQUESTING A ORDER FOR A  WHEELCHAIR CALLED THE [employer].         *PLEASE ADVISE.

## 2024-06-06 NOTE — PROGRESS NOTES
"Social Work Clinic Appointment Notes    Date of Appointment: 06/06/2024    Patient's Name: Viji or "Ines"    Caregiver's Name (if applicable): Rajni Martines,  met with patient and her daughter during patient's neurology appointment with LEEROY Uribe. SW introduced herself as a care management resource. Patient's daughter inquired about insurance coverage regarding skilled nursing facilities. SW and family discussed increasing care at her assisted living facility, including medication management and addressing fall risks in her residence. Patient has an emergency button that she can use if she were to fall; patient reports she has had to use it and help came.     Patient is coming in tomorrow for a skin biopsy.      provided contact information, encouraging the family to reach out with any care management questions or concerns.    "

## 2024-06-07 ENCOUNTER — PROCEDURE VISIT (OUTPATIENT)
Dept: NEUROLOGY | Facility: CLINIC | Age: 79
End: 2024-06-07
Payer: MEDICARE

## 2024-06-07 ENCOUNTER — TELEPHONE (OUTPATIENT)
Dept: PRIMARY CARE CLINIC | Facility: CLINIC | Age: 79
End: 2024-06-07
Payer: MEDICARE

## 2024-06-07 VITALS — SYSTOLIC BLOOD PRESSURE: 117 MMHG | DIASTOLIC BLOOD PRESSURE: 74 MMHG | HEART RATE: 81 BPM

## 2024-06-07 DIAGNOSIS — G20.C PARKINSONISM, UNSPECIFIED PARKINSONISM TYPE: Primary | ICD-10-CM

## 2024-06-07 PROCEDURE — 11104 PUNCH BX SKIN SINGLE LESION: CPT | Mod: S$PBB,,, | Performed by: NURSE PRACTITIONER

## 2024-06-07 PROCEDURE — 11105 PUNCH BX SKIN EA SEP/ADDL: CPT | Mod: S$PBB,,, | Performed by: NURSE PRACTITIONER

## 2024-06-07 PROCEDURE — 11105 PUNCH BX SKIN EA SEP/ADDL: CPT | Mod: PBBFAC | Performed by: NURSE PRACTITIONER

## 2024-06-07 PROCEDURE — 11104 PUNCH BX SKIN SINGLE LESION: CPT | Mod: PBBFAC | Performed by: NURSE PRACTITIONER

## 2024-06-07 NOTE — PROCEDURES
PRE-OP DIAGNOSIS:  POST-OP DIAGNOSIS: Same   PROCEDURE: skin punch biopsy     Performing Physician: Daniella Uribe NP  Supervising Physician (if applicable): Ceasar Faulkner MD.     PROCEDURE:   _  Shave Biopsy  X  Punch Biopsy  _  Incisional Biopsy     DESCRIPTION:  - Punch Size: 0.5 cm  - Number of Punch Biopsies: 3  + CPT 20465 (punch biopsy, neck) 1st procedure  + CPT 68644 (punch biopsy, each additional lesion, thigh) 2nd procedure  + CPT 96555 (punch biopsy, each additional lesion, ankle) 3rd procedure     The area surrounding the skin lesion was prepared and draped in the  usual sterile manner. The lesion was removed in the usual manner by punch biopsy method noted above. Three full thickness cylindrical samples of the skin were removed from the upper back, lower thigh, and lower leg. The intent of the biopsy is to remove a sample of a cutaneous lesion for Syn-One diagnostic pathologic examination. Hemostasis was assured. The patient tolerated  the procedure well.     Closure:       _  Monsels for hemostasis                _  Suture   X Simple closure  _ None     Followup: The patient tolerated the procedure well without  complications.  Standard post-procedure care is explained and return  precautions are given.     Pathology/biopsy specimens were sent directly to [facility] for processing. Pathology was not sent via in-house [facility] laboratory. Pathology results will be returned within 4-6 weeks and scanned into [facility] Electronic Medical Record.

## 2024-06-07 NOTE — TELEPHONE ENCOUNTER
----- Message from Joe Molina MD sent at 6/7/2024 11:30 AM CDT -----  Regarding: RE: #PLEASE ADVISE.  Contact: Rajni/Daughter [phone]  Tell her you faxed it. Now the wait depends on the Home[employer] company and insurance  ----- Message -----  From: Jenny Randle MA  Sent: 6/7/2024  11:10 AM CDT  To: Joe Molina MD  Subject: #PLEASE ADVISE.                                    ----- Message -----  From: Deepika Gatica  Sent: 6/7/2024  10:49 AM CDT  To: Tracy Diaz Staff    Pt daughter Rajni called and stated that she wants to check the status of the wheelchair order.     Please Call and advise

## 2024-06-14 PROBLEM — G20.C PARKINSONISM: Status: ACTIVE | Noted: 2024-06-14

## 2024-06-14 NOTE — ASSESSMENT & PLAN NOTE
Ms. cook has a mobility limitation that significantly impairs her ability to participate in one or more mobility related activities of daily living (MRADL's) such as toileting, feeding, dressing, grooming, and bathing in customary locations in the home. The mobility limitation cannot be sufficiently resolved by the use of a cane or walker. The patient can self propel in a lightweight wheelchair, cannot self propel in a standard wheelchair. The use of a lightweight wheelchair will significantly improve the patient's ability to participate in MRADLS and the patient will use it on regular basis in the home. Ms cook has expressed her willingness to use a manual wheelchair in the home. Patient's upper body strength is sufficient for propulsion. She also has a caregiver who is available, willing, and able to provide assistance with the wheelchair when needed.

## 2024-07-09 ENCOUNTER — TELEPHONE (OUTPATIENT)
Dept: NEUROLOGY | Facility: CLINIC | Age: 79
End: 2024-07-09
Payer: MEDICARE

## 2024-07-09 ENCOUNTER — OFFICE VISIT (OUTPATIENT)
Dept: NEUROLOGY | Facility: CLINIC | Age: 79
End: 2024-07-09
Payer: MEDICARE

## 2024-07-09 DIAGNOSIS — G31.9 NEURODEGENERATIVE COGNITIVE IMPAIRMENT: ICD-10-CM

## 2024-07-09 DIAGNOSIS — Z81.8 FAMILY HISTORY OF SENILE DEMENTIA: ICD-10-CM

## 2024-07-09 DIAGNOSIS — G20.C PRIMARY PARKINSONISM: ICD-10-CM

## 2024-07-09 DIAGNOSIS — G31.83 LEWY BODY PARKINSON'S DISEASE: ICD-10-CM

## 2024-07-09 DIAGNOSIS — G31.84 MCI (MILD COGNITIVE IMPAIRMENT): Primary | ICD-10-CM

## 2024-07-09 DIAGNOSIS — F02.80 LEWY BODY PARKINSON'S DISEASE: ICD-10-CM

## 2024-07-09 PROCEDURE — 99215 OFFICE O/P EST HI 40 MIN: CPT | Mod: 25,95,, | Performed by: PSYCHIATRY & NEUROLOGY

## 2024-07-09 PROCEDURE — 96116 NUBHVL XM PHYS/QHP 1ST HR: CPT | Mod: 59,95,, | Performed by: PSYCHIATRY & NEUROLOGY

## 2024-07-09 NOTE — TELEPHONE ENCOUNTER
----- Message from Clint Herrera sent at 7/9/2024  8:28 AM CDT -----  Regarding: Virtual to phone call  Contact: Rajni/daughter [phone]  Patient's daughter Rajni calling requesting a callback from nurse or provider. She want to know if today's virtual appointment can be a phone call instead. Please call back as soon as possible.

## 2024-07-09 NOTE — PROGRESS NOTES
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 abnormal.  Her gait speed was abnormal (70-80 F 1.13 m/s M 1.26 m/s, >80 F 0.94 m/sec, M 0.97 m/sec). Comment: slow;  Her stride (gait cycle) was abnormal. Comment: shuffling;  Her arms swing is abnormal. Comment: B/L, R>L, Mild; asymmetric and decreased amplitude.  She takes turns in >4 steps.  She has truncal ataxia.  When attempting to walk abnormally (heels, tiptoes, tandem), she makes errors.  While walking on her tiptoes for ten steps, she makes deviations.  While walking on her heels for ten steps, she makes deviations.  While walking tandem for ten steps, she makes deviations.  While walking with eyes closed for ten steps, she makes deviations.  She has evidence of posture/balance impairment.  Retropulsion testing showed abnormal recovery.  She has evidence of a specific gait disorder.  She has evidence of parkinsonism gait disorder. Comment: Gait is rigid akinetic with a short step length, a narrow base, and a stooped posture involving the neck, shoulders, and trunk. Arm swing is  reduced. Steppage height is reduced (shuffling). Stride variability is increased. When asked to walk faster, patients increase the step frequency rather than step length.; Gait is rigid akinetic with a short step length, a narrow base, and a stooped posture involving the neck, shoulders, and trunk. Arm swing is reduced. Steppage height is reduced (shuffling). Stride variability is increased. When asked to walk faster, patients increase the step frequency rather than step length.  Neuropsychological Evaluation Summary:  Prior Neurocognitive/Neurobehavioral Evaluation(s)  No Prior Testing Available  2024-01-04:  Executive predominant multi domain mild cognitive impairment  Mild Visuospatial Impairment: visuospatial construction, simultanagnosia, prosopagnosia.  Mild Executive Impairment: She scored >1.5 standard deviations below the norm on at least one measure. She had difficulty with lexical fluency, semantic fluency, working memory.  Very Mild Attention Impairment: orientation.  MMSE 27/30: MMSE Score suggestive of normal to questionable cognitive impairment.  MOCA 26/30: MOCA Score suggestive of mild cognitive impairment.  2024-02-20:  Executive predominant multi domain mild cognitive impairment  MMSE 27/30: MMSE Score suggestive of normal to questionable cognitive impairment.  MOCA 26/30: MOCA Score suggestive of mild cognitive impairment.  Neurocognitive/Neurobehavioral Evaluation completed on 2024-07-09    Neuropsychiatric/Behavioral Focused Evaluation Assessment    BEHAV5+ 0/6 See ROS section for a full description   Laboratories:     Lab Date Value [Reference]   Metabolic Screening           CERULOPLASMIN 01/04/2024  33.0 [15.0 - 45.0 mg/dL]      Free T4 09/14/2023  0.91 [0.71 - 1.51 ng/dL]      HDL Particle Number 2024, Jan-04    >41.0 [>=33.0 umol/L]      HDL Size 2024, Jan-04    9.8 [>=8.9 nm]      Large VLDL Particle Number 2024, Jan-04    1.7 [<=2.7 nmol/L]      LDL Particle Number by NMR 2024, Jan-04     1466 (H) [<=1135 nmol/L]      LDL Particle Size 2024, Jan-04    21.4 [>=20.7 nm]      Lipids, HDL Cholesterol 2024, Jan-04    95 (H) [40 - 59 mg/dL]      Lipids, LDL Cholesterol 2024, Jan-04    110 [<=129 mg/dL]      Lipids, Total Cholesterol 2024, Jan-04    231 (H) [<=199 mg/dL]      Lipids, Triglycerides 01/04/2024  129 [30 - 149 mg/dL]      Methlymalonic Acid 01/04/2024  0.15 [<0.40 umol/L]      Small LDL Particle Number 01/04/2024  <165 [<=634 nmol/L]      T4 Total 01/04/2024  7.3 [4.5 - 11.5 ug/dL]      TSH 09/14/2023  4.808 (H) [0.400 - 4.000 uIU/mL]      VLDL Size 2024, Jan-04    42.3 [<=46.7 nm]      Glucose 2024, Jan-04    80 [70 - 110 mg/dL]      Albumin 2024, Jan-04    3.8 [3.5 - 5.2 g/dL]      ALT 2024, Jan-04    29 [10 - 44 U/L]      AST 2024, Jan-04    32 [10 - 40 U/L]      BILIRUBIN TOTAL 2024, Jan-04    0.4 [0.1 - 1.0 mg/dL]      PROTEIN TOTAL 01/04/2024  7.6 [6.0 - 8.4 g/dL]      B12 Def. Methylmalonic Acid 01/04/2024  0.19 [<=0.40 nmol/mL]      Folate 01/04/2024  16.9 [4.0 - 24.0 ng/mL]      Thiamine 09/14/2023  91 [38 - 122 ug/L]      Vitamin B-12 2023, Sep-14    477 [210 - 950 pg/mL]      Toxin/Heavy Metal Screening   Copper 2024, Jan-04    1116 [810 - 1990 ug/L]      Manganese, Serum 2024, Jan-04    0.7 [0.5 - 1.2 ng/mL]      Neurodegenerative Skin Protein Biomarkers   Distal Leg 2024, Jun-07    Normal No phosphorylated alpha-synuclein deposition observed in stained sections. [Normal]      Distal Thigh 2024, Jun-07    Normal No phosphorylated alpha-synuclein deposition observed in stained sections. [Normal]      Posterior Cervical 2024, Jun-07    Abnormal One colocalized fiber seen across all stained sections. [Normal]      Cerebrospinal Fluid Assessment   IgG 01/04/2024  1252 [650 - 1600 mg/dL]      Neuroendocrine/Electrolyte Screening   Magnesium 01/04/2024  2.3 [1.6 - 2.6 mg/dL]      BUN 2024, Jan-04    10 [8 - 23 mg/dL]      Chloride 2024, Jan-04    108 [95 - 110 mmol/L]      Creatinine  2024, Jan-04    0.8 [0.5 - 1.4 mg/dL]      Potassium 2024, Jan-04    3.9 [3.5 - 5.1 mmol/L]      Sodium 2024, Jan-04    143 [136 - 145 mmol/L]      Neurodegenerative Serum Fluid Assessment   NEUROFILAMENT LIGHT CHAIN, PLASMA 2024, Jan-04    31.5 [<=37.9 pg/mL]      Neurodegenerative Cerebrospinal Fluid Assessment   Phospho-Tau (181P) 2024, Jan-04    1.36 (H) [0.00 - 0.97 pg/mL]      Standard Hematology Screen   Hematocrit 2023, Sep-14    41.7 [37.0 - 48.5 %]      Hemoglobin 2023, Sep-14    13.1 [12.0 - 16.0 g/dL]      MCV 01/04/2024  101 (H) [82 - 98 fL]      Infectious Disease/Immunocompromised Screening   Syphilis Treponemal Ab 01/04/2024  Nonreactive [Nonreactive ]      Delirium Screening   Bacteria, UA 2023, Sep-14    Rare [None-Occ /hpf]      Glucose, UA 2023, Sep-14    Negative      Ketones, UA 2023, Sep-14    Negative      Leukocytes, UA 2023, Sep-14    1+ !      NITRITE UA 2023, Sep-14    Negative      Protein, UA 2023, Sep-14    Trace !           Neuroimaging:    MRI brain/head without contrast on 2/9/2024  Formal interpretation by Radiology:  No acute intracranial process. Volume loss that is diffuse in nature and involves the hippocampal formations. No advanced chronic ischemic changes.  Independently reviewed radiological imaging by Ceasar Zee MD. MPH. Behavioral Neurologist  T1: moderate degree of generalized cortical atrophy parietal/dorsal greater than ventral. Regional sulci widening most well appreciated the mid and posterior cingulate sulcus marginal sulcus regional atrophy appreciated the parietal lobule retro splenial cortex and posterior cingulate. Intact corpus callosum though mildly thin. Intake midbrain and brainstem with normal volume and ratio. No clinically relevant cerebellar atrophy. Mild-to-moderate right greater than left hippocampal volume loss mild volume loss in the bilateral right greater than left anterior temporal poles without any clear frontotemporal atrophy.  T2/FLAIR:  Mild anterior periventricular capping and large cavum septum vergae mild white matter changes in the posterior dorsum of the midbrain and cruz. Gliotic changes of bilateral hippocampi right greater than left  DWI/ADC: No Significant DWI hyperintensities/hypointensities. No ADC correlation.  SWI/GRE: No Significant hypointensities to suggest cortical/subcortical hemosiderin deposition.  Impression: : Mild-to-moderate generalized cortical atrophy posterior parietal predominance regional atrophy appreciated in the right greater than left hippocampal structures. Cavum septum vergae possibly suggestive of age indeterminate head trauma. No clear frontotemporal atrophy. No strategic infarcts.    CT brain/head without contrast on 7/22/2023  Formal interpretation by Radiology:  No acute abnormality.  Independently reviewed radiological imaging by Ceasar Zee MD. MPH. Behavioral Neurologist  Impression: : Mild generalized cortical atrophy posterior>frontal, dorsal>ventral, lateral>medial With bilateral hippocampal volume loss with Alzheimer's disease     Procedures:  No behavioral neurology relevant procedures are currently available for review.     Clinical Summary:     The patient is a 78-year-old right-handed female without a relevant past medical history who presents reporting a 2-year history of gradually progressive neurocognitive impairment.       The clinical history is suggestive of:  Memory Impairment: LTM encoding-retrieval impairment  Executive Impairment: Energization, Working Memory  Visuospatial Impairment: Allocentric Spatial Processing  Motor/Coordination Impairment: Sensory motor integration, Central pattern generators dysfunction  The neurological examination is significant for:  Cerebellar Dysfunction: truncal ataxia (walking)  Cortical Frontal Dysfunction: non-fluent aphasia (fluency)  Cortical Occipital Dysfunction: simultanagnosia (ventral)  Cortical Parietal Dysfunction: agnosia  (agraphesthesia)  Cortical Temporal-Parietal Dysfunction: dysgraphia  Cortical Transcallosal Disconnection: interhemispheric motor control (interhemispheric motor control ), motor efference (motor overflow)  Executive Impairment: thought disorder  Motor Coordination: gait imbalance (tiptoes)  Movement Disorder (Gait): strength (difficulty rising), abnormal features (stance, speed, stride/cycle, abnormal swing, difficulty turning), dorsiflexion weakness (heel walking), gait syndrome (rigid-akinetic)  Movement Disorder (Hyperkinetic): tremor (postural)  Movement Disorder (Hypokinetic): parkinsonism (diminished facial expression, tone, resting tremor, bradykinesia), dyskinesia (slowing, hypometria, dysrhythmia)  Movement Disorder (Ocular): eyelid apraxia  Movement Disorder (Speech): abnormal vocal features (volume, rate)  Sensory Dysfunction: peripheral (A? fibers, A?/C fibers)  The neurocognitive battery is significant (based on age and education) for:  Executive predominant multi domain mild cognitive impairment  MMSE 27/30: MMSE Score suggestive of normal to questionable cognitive impairment.  MOCA 26/30: MOCA Score suggestive of mild cognitive impairment.  BEHAV5+ 0/6: See ROS section for a full description  The neurologically relevant imaging is significant for  MRI brain/head without contrast (2/9/2024): Mild-to-moderate generalized cortical atrophy posterior parietal predominance regional atrophy appreciated in the right greater than left hippocampal structures. Cavum septum vergae possibly suggestive of age indeterminate head trauma. No clear frontotemporal atrophy. No strategic infarcts.  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     The patient's clinical presentation meets the criteria for Mild Cognitive Impairment (MCI-ADRC) (Reese MS, et al. 2011 Alzheimer's & Dementia).     Concern regarding an intraindividual change in cognition  Impairment in one or more cognitive domains  Preservation of independence in functional abilities.  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  A progressive cognitive decline that interferes with normal social or occupational function.  Prominent or persistent memory impairment may not necessarily occur in the early stages but is usually evident with progression.  Deficits on tests of attention, executive function, and visuospatial ability may be especially prominent.  Spontaneous motor features of parkinsonism.     At present, all neurodegenerative diseases can only be diagnosed with 100% certainty through a brain autopsy. The suspected neuropathology underlying the patient's neurocognitive impairment is likely a mixture of pathologies (Alzheimer's Disease Related Pathology, Lewy body disease/alpha-synucleinopathy, Vascular Contributions to Cognitive Impairment and Dementia).  Plasma Protein Biomarkers: [01/04/2024] Neurofilament Light Chain (Nfl): 31.5. [01/04/2024] Neurofilament Light Chain (Nfl): 31.5.  Cerebrospinal Fluid Protein Biomarkers: [01/04/2024] Phospho-Tau (181P): 1.36 (H). [01/04/2024] Phospho-Tau (181P): 1.36 (H).  [employer] Skin Biomarkers: [06/07/2024] Syn-One PC: 1 (Positive). Syn-One DT: 0 (Negative). Syn-One DL: 0 (Negative).  There is no relevant genetic biomarkers available on record.        The observations made above, were discussed with the patient. We have discussed the additional diagnostic(s) and/or managenent below.     Care Management  Plan:    #Diagnostic Screening for measurable forms of neurodegenerative pathology.  We have discussed opportunities for biomarker testing (CSF Fry biomarkers, IDEAs Amyloid-PET, Syn-One skin biopsy).  We scheduled a skin biopsy for assessment of Syn-One alpha-synuclein related pathology  #Optimize Neurocognitive Impairment and Quality  We have discussed the MIND Diet and other lifestyle behavior that may help maintain brain health.  We have provided written/digital reading material  Continue donepezil 10 mg; refills placed  Start B12 5000 mcg Q weekly  Th patient declines interest in anti amyloid therapy and declines interest in lumbar puncture  #Optimize Behavioral Management and Quality.  Continue Namenda 10 mg b.i.d.  #Optimize Sleep Hygiene and Quality  We discussed and recommended additional diagnostic/management of sleep disorder to optimize brain health and longevity.  follow-up referral to sleep Medicine for symptoms suggestive of sleep apnea  #Optimize Movement Disorder and Quality.  We have referred to Neurology-specific physical therapy/occupational therapy.  #Optimize Cerebrovascular Health.  The patient has a documented history of hyperlipidemia and/or hypercholesteremia with long-term complications such as cerebrovascular disease, peripheral vascular disease, and/or aortic atherosclerosis. Collectively these risk factors may contribute to cerebral atherosclerosis, and cerebral hypoperfusion compounded neurocognitive disorder. We discussed maximizing cerebrovascular-related medical therapy, including but not limited to cholesterol medications and antiplatelet agents. We have discussed the value of aggressively controlling vascular risk factors like hypertension, hyperlipidemia, and Diabetes SBP<130, LDL<100, and A1C<7.0. We discussed the need to optimize lifestyle choices, including a heart-healthy diet (e.g., Mediterranean or DASH), increased cardiovascular exercise (goal 150 minutes of  "moderate-intensity per week), and staying cognitively and socially active.  continue atorvastatin 10 mg daily  Continue aspirin 81 mg daily  #Behavioral/Environmental Strategies  We recommend engaging in activities that stimulate cognitively and socially while avoiding excessive stimulation and fatigue in overwhelmingly complex situations.  We recommend integrating routine and schedule into your daily life. https://www.alzheimersproject.org/news/the-importance-of-routine-and-familiarity-to-persons-with-dementia/  #Health Maintenance/Lifestyle Advice  We have discussed the value in aggressively controlling vascular risk factors like hypertension, hyperlipidemia, and Diabetes SBP<130, LDL<100, A1C<7.0.  We discussed the need to optimize lifestyle choices including a heart-healthy diet (e.g., Mediterranean or DASH), increased cardiovascular exercise (goal 150 minutes of moderate-intensity per week), and stay cognitively and socially active.  We recommend the MIND diet, a combination of two healthy diets: the Mediterranean diet and the DASH (Dietary Approaches to Stop Hypertension) diet, and includes a variety of brain-friendly foods to optimize cognitive health and longevity.  #Support  We all need support sometimes. Get easy access to local resources, community programs, and services. https://www.communityresourcefinder.org/  Learn more about Cognitive Impairment in [place]: https://www.alz.org/professionals/public-health/state-overview/[place]  #Safety  The Alzheimer's Association administers the nationwide "Safe Return" program with identification bracelets, necklaces, or clothing tags and 24-hour assistance. More information is available online at https://www.alz.org/help-support/caregiving/safety/medicalert-with-24-7-wandering-support  #Follow up:  Follow-up as needed.    Thank you for allowing us to participate in the care of your patient. Please do not hesitate to contact us with any questions or concerns. "     It was a pleasure seeing The patient and we look forward to seeing them at their follow-up visit.     This note is dictated on M*Modal Fluency Direct word recognition program. There are word recognition mistakes that are occasionally missed on review.       Scheduled Follow-up :  No future appointments.    After Visit Medication List :     Medication List            Accurate as of July 9, 2024  1:07 PM. If you have any questions, ask your nurse or doctor.                CONTINUE taking these medications      aspirin 81 mg Cap     atorvastatin 10 MG tablet  Commonly known as: LIPITOR  Take 1 tablet (10 mg total) by mouth once daily.     CALCIUM 300 ORAL     co-enzyme Q-10 30 mg capsule     cyanocobalamin (vitamin B-12) 5,000 mcg Subl  Place one under tongue once a day for 1 month, then continue to take under tongue per week     donepeziL 10 MG tablet  Commonly known as: ARICEPT  Take 1 tablet (10 mg total) by mouth every morning.     multivitamin per tablet  Commonly known as: THERAGRAN              Signing Physician:  Ceasar Faulkner MD    Billing:       -----------------------------------------------------------------------------  I performed this consultation using real-time Telehealth tools, including a live video connection between my location and the patient's location (their home within the [facility]). Prior to initiating the consultation, I obtained informed verbal consent to perform this consultation using Telehealth tools and answered all the questions about the Telehealth interaction. The participants understand that only a limited neurological exam and limited neuropsychological testing can be performed using Telehealth tools.  I spent a total of 45 minutes (time-in: 13:00 PM; time-out: 13:45 PM) on 2024-07-09, virtually face-to-face with the patient and caregiver(s), >50% of that time was spent counseling regarding the symptoms, treatment plan, risks, therapeutic options, lifestyle  modifications, and/or safety issues for the diagnoses above.  10/14 Review of Systems completed and is negative except as stated above in HPI (Systems reviewed: Const, Eyes, ENT, Resp, CV, GI, , MSK, Skin, Neuro)  I reviewed previous labs for a total of 10 minutes on 2024-07-09. This is directly related to the face-to-face encounter. Review of previous labs was performed all negative except as stated above in HPI  I performed a neurobehavioral status examination that included a clinical assessment of thinking, reasoning, and judgment to ensure a comprehensive approach in managing the complex and evolving needs of the patient's neurocognitive condition. Please see above HPI and ROS for full details. This exam was performed on 2024-07-09 and included 13 minutes spent on direct face-to-face clinical observation and interview with the patient and 19 minutes spent interpreting test results and preparing the report. The total time of 32 minutes spent on the neurobehavioral status examination is not included in the time spent on evaluation and management coding.  Total Billing time spent on encounter/documentation for this patient's evaluation and management, not including the neurobehavioral status examination: 42 minutes.

## 2024-07-09 NOTE — TELEPHONE ENCOUNTER
Spoke with patient's daughter and informed that 7/09/2024 virtual appointment must be a virtual video as there will be visual aids presented by MD that patient and daughter will must understand.    Daughter verbally understood.

## 2024-07-10 ENCOUNTER — TELEPHONE (OUTPATIENT)
Dept: PRIMARY CARE CLINIC | Facility: CLINIC | Age: 79
End: 2024-07-10
Payer: MEDICARE

## 2024-07-10 NOTE — TELEPHONE ENCOUNTER
Please reach out to this worry score 2 patient to schedule him/her for a follow up with his/her PCP. Thank you!

## 2024-07-16 ENCOUNTER — TELEPHONE (OUTPATIENT)
Dept: NEUROLOGY | Facility: CLINIC | Age: 79
End: 2024-07-16
Payer: MEDICARE

## 2024-07-16 NOTE — TELEPHONE ENCOUNTER
Spoke with [facility] pharmacy to inquire about patient's B12 prescription. [facility] never filled any medications for this patient before but will reach out to patient/daughter.

## 2024-07-16 NOTE — TELEPHONE ENCOUNTER
----- Message from Ceasar Faulkner MD sent at 7/15/2024  5:07 PM CDT -----  Refills were placed  ----- Message -----  From: Eze Rubio  Sent: 7/15/2024   4:30 PM CDT  To: Kaya Alicea RN; Ceasar Faulkner MD    This patient already had their skin biopsy follow up with Dr. Faulkner? On 7/09/2024.    Unfortunately, this triggered me into calling patient/CG and CG asked me that she does not see a 'medication' , that was discussed during appointment, sent to the pharmacy.    I think she's referring to B12 5000mcg cause refills for donepezil are already placed.  ----- Message -----  From: Kaya Alicea RN  Sent: 7/15/2024   3:58 PM CDT  To: Ceasar Faulkner MD; Eze Rubio    Syn One biopsy results

## 2025-01-13 ENCOUNTER — PATIENT MESSAGE (OUTPATIENT)
Facility: CLINIC | Age: 80
End: 2025-01-13
Payer: MEDICARE

## 2025-02-24 DIAGNOSIS — Z00.00 ENCOUNTER FOR MEDICARE ANNUAL WELLNESS EXAM: ICD-10-CM
